# Patient Record
Sex: MALE | Race: WHITE | NOT HISPANIC OR LATINO | Employment: FULL TIME | URBAN - METROPOLITAN AREA
[De-identification: names, ages, dates, MRNs, and addresses within clinical notes are randomized per-mention and may not be internally consistent; named-entity substitution may affect disease eponyms.]

---

## 2018-02-15 ENCOUNTER — APPOINTMENT (OUTPATIENT)
Dept: RADIOLOGY | Facility: MEDICAL CENTER | Age: 48
End: 2018-02-15
Attending: EMERGENCY MEDICINE

## 2018-02-15 ENCOUNTER — HOSPITAL ENCOUNTER (EMERGENCY)
Facility: MEDICAL CENTER | Age: 48
End: 2018-02-15
Attending: EMERGENCY MEDICINE

## 2018-02-15 VITALS
HEIGHT: 70 IN | BODY MASS INDEX: 29.76 KG/M2 | SYSTOLIC BLOOD PRESSURE: 102 MMHG | WEIGHT: 207.89 LBS | DIASTOLIC BLOOD PRESSURE: 53 MMHG | RESPIRATION RATE: 21 BRPM | OXYGEN SATURATION: 95 % | HEART RATE: 79 BPM | TEMPERATURE: 99 F

## 2018-02-15 DIAGNOSIS — J10.1 INFLUENZA B: ICD-10-CM

## 2018-02-15 DIAGNOSIS — D69.6 THROMBOCYTOPENIA (HCC): ICD-10-CM

## 2018-02-15 DIAGNOSIS — E86.0 DEHYDRATION: ICD-10-CM

## 2018-02-15 LAB
ALBUMIN SERPL BCP-MCNC: 3.9 G/DL (ref 3.2–4.9)
ALBUMIN/GLOB SERPL: 1.1 G/DL
ALP SERPL-CCNC: 73 U/L (ref 30–99)
ALT SERPL-CCNC: 25 U/L (ref 2–50)
ANION GAP SERPL CALC-SCNC: 9 MMOL/L (ref 0–11.9)
APPEARANCE UR: CLEAR
AST SERPL-CCNC: 18 U/L (ref 12–45)
BASOPHILS # BLD AUTO: 0.4 % (ref 0–1.8)
BASOPHILS # BLD: 0.02 K/UL (ref 0–0.12)
BILIRUB SERPL-MCNC: 0.6 MG/DL (ref 0.1–1.5)
BUN SERPL-MCNC: 13 MG/DL (ref 8–22)
CALCIUM SERPL-MCNC: 9.2 MG/DL (ref 8.5–10.5)
CHLORIDE SERPL-SCNC: 105 MMOL/L (ref 96–112)
CO2 SERPL-SCNC: 23 MMOL/L (ref 20–33)
COLOR UR AUTO: YELLOW
CREAT SERPL-MCNC: 0.85 MG/DL (ref 0.5–1.4)
EOSINOPHIL # BLD AUTO: 0.16 K/UL (ref 0–0.51)
EOSINOPHIL NFR BLD: 3.5 % (ref 0–6.9)
ERYTHROCYTE [DISTWIDTH] IN BLOOD BY AUTOMATED COUNT: 42.7 FL (ref 35.9–50)
FLUAV RNA SPEC QL NAA+PROBE: NEGATIVE
FLUBV RNA SPEC QL NAA+PROBE: POSITIVE
GLOBULIN SER CALC-MCNC: 3.5 G/DL (ref 1.9–3.5)
GLUCOSE SERPL-MCNC: 88 MG/DL (ref 65–99)
GLUCOSE UR QL STRIP.AUTO: NEGATIVE MG/DL
HCT VFR BLD AUTO: 44 % (ref 42–52)
HGB BLD-MCNC: 14.5 G/DL (ref 14–18)
KETONES UR QL STRIP.AUTO: NEGATIVE MG/DL
LACTATE BLD-SCNC: 1.2 MMOL/L (ref 0.5–2)
LEUKOCYTE ESTERASE UR QL STRIP.AUTO: NEGATIVE
LYMPHOCYTES # BLD AUTO: 1.24 K/UL (ref 1–4.8)
LYMPHOCYTES NFR BLD: 27 % (ref 22–41)
MCH RBC QN AUTO: 26.3 PG (ref 27–33)
MCHC RBC AUTO-ENTMCNC: 33 G/DL (ref 33.7–35.3)
MCV RBC AUTO: 79.9 FL (ref 81.4–97.8)
MONOCYTES # BLD AUTO: 0.43 K/UL (ref 0–0.85)
MONOCYTES NFR BLD AUTO: 9.4 % (ref 0–13.4)
NEUTROPHILS # BLD AUTO: 2.74 K/UL (ref 1.82–7.42)
NEUTROPHILS NFR BLD: 59.7 % (ref 44–72)
NITRITE UR QL STRIP.AUTO: NEGATIVE
PH UR STRIP.AUTO: 5.5 [PH]
PLATELET # BLD AUTO: 98 K/UL (ref 164–446)
PMV BLD AUTO: 11.9 FL (ref 9–12.9)
POTASSIUM SERPL-SCNC: 3.7 MMOL/L (ref 3.6–5.5)
PROT SERPL-MCNC: 7.4 G/DL (ref 6–8.2)
PROT UR QL STRIP: NEGATIVE MG/DL
RBC # BLD AUTO: 5.51 M/UL (ref 4.7–6.1)
RBC UR QL AUTO: ABNORMAL
SODIUM SERPL-SCNC: 137 MMOL/L (ref 135–145)
SP GR UR: 1.01
WBC # BLD AUTO: 4.6 K/UL (ref 4.8–10.8)

## 2018-02-15 PROCEDURE — 99284 EMERGENCY DEPT VISIT MOD MDM: CPT

## 2018-02-15 PROCEDURE — 96360 HYDRATION IV INFUSION INIT: CPT

## 2018-02-15 PROCEDURE — 81002 URINALYSIS NONAUTO W/O SCOPE: CPT

## 2018-02-15 PROCEDURE — 87502 INFLUENZA DNA AMP PROBE: CPT

## 2018-02-15 PROCEDURE — 700105 HCHG RX REV CODE 258: Performed by: EMERGENCY MEDICINE

## 2018-02-15 PROCEDURE — 71046 X-RAY EXAM CHEST 2 VIEWS: CPT

## 2018-02-15 PROCEDURE — 85025 COMPLETE CBC W/AUTO DIFF WBC: CPT

## 2018-02-15 PROCEDURE — 80053 COMPREHEN METABOLIC PANEL: CPT

## 2018-02-15 PROCEDURE — 83605 ASSAY OF LACTIC ACID: CPT

## 2018-02-15 PROCEDURE — 700102 HCHG RX REV CODE 250 W/ 637 OVERRIDE(OP): Performed by: EMERGENCY MEDICINE

## 2018-02-15 PROCEDURE — 36415 COLL VENOUS BLD VENIPUNCTURE: CPT

## 2018-02-15 PROCEDURE — A9270 NON-COVERED ITEM OR SERVICE: HCPCS | Performed by: EMERGENCY MEDICINE

## 2018-02-15 RX ORDER — SODIUM CHLORIDE 9 MG/ML
1000 INJECTION, SOLUTION INTRAVENOUS ONCE
Status: COMPLETED | OUTPATIENT
Start: 2018-02-15 | End: 2018-02-15

## 2018-02-15 RX ORDER — ACETAMINOPHEN 325 MG/1
650 TABLET ORAL ONCE
Status: COMPLETED | OUTPATIENT
Start: 2018-02-15 | End: 2018-02-15

## 2018-02-15 RX ADMIN — ACETAMINOPHEN 650 MG: 325 TABLET, FILM COATED ORAL at 14:18

## 2018-02-15 RX ADMIN — SODIUM CHLORIDE 1000 ML: 9 INJECTION, SOLUTION INTRAVENOUS at 12:15

## 2018-02-15 ASSESSMENT — ENCOUNTER SYMPTOMS
FLANK PAIN: 1
SPUTUM PRODUCTION: 1
NAUSEA: 1
FEVER: 1
VOMITING: 0
COUGH: 1
SORE THROAT: 1
DIARRHEA: 1

## 2018-02-15 ASSESSMENT — PAIN SCALES - GENERAL: PAINLEVEL_OUTOF10: 6

## 2018-02-15 NOTE — ED PROVIDER NOTES
ED Provider Note    Scribed for Osmany Morataya M.D. by Aleida Ryder. 2/15/2018, 10:12 AM.    Primary care provider: Pcp Pt States None  Means of arrival: walk in   History obtained from: patient   History limited by: none     CHIEF COMPLAINT  Chief Complaint   Patient presents with   • Body Aches     symtoms started sunday   • Cough   • N/V       HPI  Prashant Russo Jr. is a 47 y.o. male who presents to the Emergency Department for evaluation of a productive cough onset  4 days ago with associated generalized body aches and subjective fevers. He has been taking Delsym and Motrin with no relief. Patient also reports having a sore throat in the past day and intermittent nausea for the past 3 days and 1-2 episodes of diarrhea yesterday with associated mild flank pain. He denies vomiting in the past day. Patient reports recent visit to [place].  Denies any symptoms at the time of his visit.  Does have sick contacts with the flu.  Also complains of nasal congestion, a bit of a sore throat associated with cough.      REVIEW OF SYSTEMS  Review of Systems   Constitutional: Positive for fever (subjective ).        + generalized body aches.    HENT: Positive for sore throat.    Respiratory: Positive for cough and sputum production.    Gastrointestinal: Positive for diarrhea and nausea. Negative for vomiting.   Genitourinary: Positive for flank pain.   All other systems reviewed and are negative.  C.       PAST MEDICAL HISTORY   has a past medical history of Dental disorder and Nephritic syndrome.      SURGICAL HISTORY   has a past surgical history that includes hip arthroscopy (3/2/2015); femoral neck osteoplasty (3/2/2015); and acetabular osteoplasty (3/2/2015).      SOCIAL HISTORY  Social History   Substance Use Topics   • Smoking status: Never Smoker   • Smokeless tobacco: Current User     Types: Chew   • Alcohol use Yes      Comment: 2 per week      History   Drug Use     Comment: 2/18/2015 marijuana, none in past 3  "months       FAMILY HISTORY  Family History   Problem Relation Age of Onset   • Diabetes Father    • Hypertension Father    • Stroke Maternal Grandmother    • Cancer Paternal Grandmother    • Heart Disease Father        CURRENT MEDICATIONS  Home Medications     Reviewed by Yamileth Cardoza, Student (Nurse Apprentice) on 02/15/18 at 1011  Med List Status: Partial   Medication Last Dose Status   ibuprofen (MOTRIN) 200 MG TABS 2/15/2018 Active   Multiple Vitamins-Minerals (MULTIVITAMIN PO) 2/1/2018 Active                ALLERGIES  No Known Allergies      PHYSICAL EXAM  VITAL SIGNS: /53   Pulse 88   Temp 37.2 °C (99 °F) (Temporal)   Resp 18   Ht 1.778 m (5' 10\")   Wt 94.3 kg (207 lb 14.3 oz)   SpO2 97%   BMI 29.83 kg/m²   Vitals reviewed.  Constitutional: Well developed, Well nourished, No acute distress, Non-toxic appearance.   HENT: Normocephalic, Atraumatic, Bilateral external ears normal, Oropharynx dry, No oral exudates, Swollen and erythematous nasal mucosa.   Eyes: PERRL, EOMI, Conjunctiva normal, No discharge.   Neck: Normal range of motion, No tenderness, Supple, No stridor.   Cardiovascular: Normal heart rate, Normal rhythm, No murmurs, No rubs, No gallops.   Thorax & Lungs: Normal breath sounds, No respiratory distress, No wheezing, No chest tenderness.   Abdomen: Bowel sounds normal, Soft, No tenderness  Skin: Warm, Dry, No erythema, No rash.   Back: No tenderness, No CVA tenderness.   Musculoskeletal: Good range of motion in all major joints.   Neurologic: Alert, Normal motor function, Normal sensory function, No focal deficits noted.   Psychiatric: Affect normal        LABS  Results for orders placed or performed during the hospital encounter of 02/15/18   CBC WITH DIFFERENTIAL   Result Value Ref Range    WBC 4.6 (L) 4.8 - 10.8 K/uL    RBC 5.51 4.70 - 6.10 M/uL    Hemoglobin 14.5 14.0 - 18.0 g/dL    Hematocrit 44.0 42.0 - 52.0 %    MCV 79.9 (L) 81.4 - 97.8 fL    MCH 26.3 (L) 27.0 - 33.0 " pg    MCHC 33.0 (L) 33.7 - 35.3 g/dL    RDW 42.7 35.9 - 50.0 fL    Platelet Count 98 (L) 164 - 446 K/uL    MPV 11.9 9.0 - 12.9 fL    Neutrophils-Polys 59.70 44.00 - 72.00 %    Lymphocytes 27.00 22.00 - 41.00 %    Monocytes 9.40 0.00 - 13.40 %    Eosinophils 3.50 0.00 - 6.90 %    Basophils 0.40 0.00 - 1.80 %    Neutrophils (Absolute) 2.74 1.82 - 7.42 K/uL    Lymphs (Absolute) 1.24 1.00 - 4.80 K/uL    Monos (Absolute) 0.43 0.00 - 0.85 K/uL    Eos (Absolute) 0.16 0.00 - 0.51 K/uL    Baso (Absolute) 0.02 0.00 - 0.12 K/uL   COMP METABOLIC PANEL   Result Value Ref Range    Sodium 137 135 - 145 mmol/L    Potassium 3.7 3.6 - 5.5 mmol/L    Chloride 105 96 - 112 mmol/L    Co2 23 20 - 33 mmol/L    Anion Gap 9.0 0.0 - 11.9    Glucose 88 65 - 99 mg/dL    Bun 13 8 - 22 mg/dL    Creatinine 0.85 0.50 - 1.40 mg/dL    Calcium 9.2 8.5 - 10.5 mg/dL    AST(SGOT) 18 12 - 45 U/L    ALT(SGPT) 25 2 - 50 U/L    Alkaline Phosphatase 73 30 - 99 U/L    Total Bilirubin 0.6 0.1 - 1.5 mg/dL    Albumin 3.9 3.2 - 4.9 g/dL    Total Protein 7.4 6.0 - 8.2 g/dL    Globulin 3.5 1.9 - 3.5 g/dL    A-G Ratio 1.1 g/dL   LACTIC ACID   Result Value Ref Range    Lactic Acid 1.2 0.5 - 2.0 mmol/L   INFLUENZA A/B BY PCR   Result Value Ref Range    Influenza virus A RNA Negative Negative    Influenza virus B, PCR POSITIVE (A) Negative   ESTIMATED GFR   Result Value Ref Range    GFR If African American >60 >60 mL/min/1.73 m 2    GFR If Non African American >60 >60 mL/min/1.73 m 2   POC UA   Result Value Ref Range    POC Color Yellow     POC Appearance Clear     POC Glucose Negative Negative mg/dL    POC Ketones Negative Negative mg/dL    POC Specific Gravity 1.010 1.005 - 1.030    POC Blood Trace-intact (A) Negative    POC Urine PH 5.5 5.0 - 8.0    POC Protein Negative Negative mg/dL    POC Nitrites Negative Negative    POC Leukocyte Esterase Negative Negative   All labs reviewed by me.          RADIOLOGY  DX-CHEST-2 VIEWS   Final Result      No acute  cardiopulmonary process is identified.      The radiologist's interpretation of all radiological studies have been reviewed by me.        COURSE & MEDICAL DECISION MAKING  Pertinent Labs & Imaging studies reviewed. (See chart for details)    Obtained and reviewed past medical records.    10:12 AM Patient seen and examined at bedside. Ordered for DX chest, lactic acid, influenza, estimated GFR, CBC, CMP, POC UA to evaluate.     12:13 PM Patien\t is marginally low blood pressure for his age and size.  Ordered IV fluids. .  I have reviewed his chart.  Previous blood pressures in the 110s over 80s.  This is not far off his baseline.  He does have a history of volume loss secondary to vomiting and diarrhea and doesn't look dehydrated.  We'll continue to observe him and I have ordered 1 L of normal saline.  He received an CMP are reassuring, as is his lactic acid.  Chest x-ray does not show an infiltrate.  I doubt there is any bacterial superinfection at this time.  He is nontoxic, well-appearing.    1:35 PM Patient reevaluated at bedside. Discussed diagnostic results with the patient in addition to plan of care. .  She felt a little better after some fluids.  His blood pressure is improved.    .  He does have low platelets, he'll be asked of this rechecked by his doctor in a few days.  This might be from viral suppression.    Recess after IV fluids.  Clinically, he has improved.    1:36 PM Paged the hopitalist.       DISPOSITION:  Discharged home in fair condition.    \Your Physician  Varies    Schedule an appointment as soon as possible for a visit in 2 days              FINAL IMPRESSION  1. Influenza B    2. Dehydration    3.  Thrombocytopenia     Aleida WESTON (Armani), am scribing for, and in the presence of, Osmany Morataya M.D..  Electronically signed by: Aleida Ryder (Armani), 2/15/2018  Osmany WESTON M.D. personally performed the services described in this documentation, as scribed by Aleida HAILE  Aleksey in my presence, and it is both accurate and complete.    The note accurately reflects work and decisions made by me.  Osmany Morataya  2/15/2018  2:18 PM

## 2018-02-15 NOTE — ED NOTES
Patient dc'd to home w/ spouse. Patient alert and oriented x 4. Patient ambulatory w/ steady gait. Patient verbalizes understanding of discharge instructions, follow up care, and OTC mediations. ERP at bedside to discuss abnormal labs w/ patient prior to discharge.

## 2018-02-15 NOTE — ED NOTES
Pt called back to triage for protocols.  ER protocol process explained to pt, including that pt may need IV and further testing once in room and seen by ERP.  Pt agreeable to start protocols.  Labs drawn and sent.  Pt provided with instruction and supplies for clean catch urine specimen.

## 2018-02-15 NOTE — ED TRIAGE NOTES
Pt ambulates to triage, wearing mask  Chief Complaint   Patient presents with   • Body Aches     symtoms started sunday   • Cough   • N/V   Pt asked to wait in lobby, pt updated on triage process and pt asked to inform RN of any changes.

## 2018-02-15 NOTE — DISCHARGE INSTRUCTIONS
Rest, drink plenty of fluids.  Follow-up with her doctor.  Return to the ER for worsening cough, fever, or other concerns.    your platelets were low.  Have this rechecked in  2 days.  See your doctor    Influenza, Adult  Influenza (flu) is an infection in the mouth, nose, and throat (respiratory tract) caused by a virus. The flu can make you feel very ill. Influenza spreads easily from person to person (contagious).   HOME CARE   · Only take medicines as told by your doctor.  · Use a cool mist humidifier to make breathing easier.  · Get plenty of rest until your fever goes away. This usually takes 3 to 4 days.  · Drink enough fluids to keep your pee (urine) clear or pale yellow.  · Cover your mouth and nose when you cough or sneeze.  · Wash your hands well to avoid spreading the flu.  · Stay home from work or school until your fever has been gone for at least 1 full day.  · Get a flu shot every year.  GET HELP RIGHT AWAY IF:   · You have trouble breathing or feel short of breath.  · Your skin or nails turn blue.  · You have severe neck pain or stiffness.  · You have a severe headache, facial pain, or earache.  · Your fever gets worse or keeps coming back.  · You feel sick to your stomach (nauseous), throw up (vomit), or have watery poop (diarrhea).  · You have chest pain.  · You have a deep cough that gets worse, or you cough up more thick spit (mucus).  MAKE SURE YOU:   · Understand these instructions.  · Will watch your condition.  · Will get help right away if you are not doing well or get worse.     This information is not intended to replace advice given to you by your health care provider. Make sure you discuss any questions you have with your health care provider.     Document Released: 09/26/2009 Document Revised: 01/08/2016 Document Reviewed: 03/18/2013  [employer] Patient Education ©2016 [employer]

## 2021-06-11 ENCOUNTER — APPOINTMENT (OUTPATIENT)
Dept: URGENT CARE | Facility: CLINIC | Age: 51
End: 2021-06-11

## 2022-02-17 ENCOUNTER — HOSPITAL ENCOUNTER (OUTPATIENT)
Dept: LAB | Facility: MEDICAL CENTER | Age: 52
End: 2022-02-17
Attending: PHYSICIAN ASSISTANT
Payer: COMMERCIAL

## 2022-02-17 LAB
ALBUMIN SERPL BCP-MCNC: 4.6 G/DL (ref 3.2–4.9)
ALBUMIN/GLOB SERPL: 1.8 G/DL
ALP SERPL-CCNC: 83 U/L (ref 30–99)
ALT SERPL-CCNC: 33 U/L (ref 2–50)
ANION GAP SERPL CALC-SCNC: 13 MMOL/L (ref 7–16)
AST SERPL-CCNC: 25 U/L (ref 12–45)
BASOPHILS # BLD AUTO: 0.7 % (ref 0–1.8)
BASOPHILS # BLD: 0.05 K/UL (ref 0–0.12)
BILIRUB SERPL-MCNC: 0.4 MG/DL (ref 0.1–1.5)
BUN SERPL-MCNC: 19 MG/DL (ref 8–22)
CALCIUM SERPL-MCNC: 9.6 MG/DL (ref 8.5–10.5)
CHLORIDE SERPL-SCNC: 102 MMOL/L (ref 96–112)
CHOLEST SERPL-MCNC: 393 MG/DL (ref 100–199)
CO2 SERPL-SCNC: 21 MMOL/L (ref 20–33)
CREAT SERPL-MCNC: <0.17 MG/DL (ref 0.5–1.4)
EOSINOPHIL # BLD AUTO: 0.28 K/UL (ref 0–0.51)
EOSINOPHIL NFR BLD: 4.1 % (ref 0–6.9)
ERYTHROCYTE [DISTWIDTH] IN BLOOD BY AUTOMATED COUNT: 46.1 FL (ref 35.9–50)
FASTING STATUS PATIENT QL REPORTED: NORMAL
GLOBULIN SER CALC-MCNC: 2.5 G/DL (ref 1.9–3.5)
GLUCOSE SERPL-MCNC: 93 MG/DL (ref 65–99)
HCT VFR BLD AUTO: 44.3 % (ref 42–52)
HDLC SERPL-MCNC: 30 MG/DL
HGB BLD-MCNC: 14.2 G/DL (ref 14–18)
IMM GRANULOCYTES # BLD AUTO: 0.04 K/UL (ref 0–0.11)
IMM GRANULOCYTES NFR BLD AUTO: 0.6 % (ref 0–0.9)
LDLC SERPL CALC-MCNC: ABNORMAL MG/DL
LYMPHOCYTES # BLD AUTO: 2.19 K/UL (ref 1–4.8)
LYMPHOCYTES NFR BLD: 32.4 % (ref 22–41)
MCH RBC QN AUTO: 27.2 PG (ref 27–33)
MCHC RBC AUTO-ENTMCNC: 32.1 G/DL (ref 33.7–35.3)
MCV RBC AUTO: 84.7 FL (ref 81.4–97.8)
MONOCYTES # BLD AUTO: 0.39 K/UL (ref 0–0.85)
MONOCYTES NFR BLD AUTO: 5.8 % (ref 0–13.4)
NEUTROPHILS # BLD AUTO: 3.81 K/UL (ref 1.82–7.42)
NEUTROPHILS NFR BLD: 56.4 % (ref 44–72)
NRBC # BLD AUTO: 0 K/UL
NRBC BLD-RTO: 0 /100 WBC
PLATELET # BLD AUTO: 161 K/UL (ref 164–446)
PMV BLD AUTO: 11.6 FL (ref 9–12.9)
POTASSIUM SERPL-SCNC: 4.6 MMOL/L (ref 3.6–5.5)
PROT SERPL-MCNC: 7.1 G/DL (ref 6–8.2)
RBC # BLD AUTO: 5.23 M/UL (ref 4.7–6.1)
SODIUM SERPL-SCNC: 136 MMOL/L (ref 135–145)
TRIGL SERPL-MCNC: 773 MG/DL (ref 0–149)
TSH SERPL DL<=0.005 MIU/L-ACNC: 1.54 UIU/ML (ref 0.38–5.33)
URATE SERPL-MCNC: 6.1 MG/DL (ref 2.5–8.3)
WBC # BLD AUTO: 6.8 K/UL (ref 4.8–10.8)

## 2022-02-17 PROCEDURE — 85025 COMPLETE CBC W/AUTO DIFF WBC: CPT

## 2022-02-17 PROCEDURE — 36415 COLL VENOUS BLD VENIPUNCTURE: CPT

## 2022-02-17 PROCEDURE — 80053 COMPREHEN METABOLIC PANEL: CPT

## 2022-02-17 PROCEDURE — 80061 LIPID PANEL: CPT

## 2022-02-17 PROCEDURE — 84443 ASSAY THYROID STIM HORMONE: CPT

## 2022-02-17 PROCEDURE — 84550 ASSAY OF BLOOD/URIC ACID: CPT

## 2022-09-07 ENCOUNTER — HOSPITAL ENCOUNTER (OUTPATIENT)
Dept: LAB | Facility: MEDICAL CENTER | Age: 52
End: 2022-09-07
Attending: PHYSICIAN ASSISTANT
Payer: COMMERCIAL

## 2022-09-07 LAB
ALBUMIN SERPL BCP-MCNC: 4.2 G/DL (ref 3.2–4.9)
ALBUMIN/GLOB SERPL: 1.8 G/DL
ALP SERPL-CCNC: 77 U/L (ref 30–99)
ALT SERPL-CCNC: 46 U/L (ref 2–50)
ANION GAP SERPL CALC-SCNC: 9 MMOL/L (ref 7–16)
AST SERPL-CCNC: 36 U/L (ref 12–45)
BASOPHILS # BLD AUTO: 0.7 % (ref 0–1.8)
BASOPHILS # BLD: 0.04 K/UL (ref 0–0.12)
BILIRUB SERPL-MCNC: 0.4 MG/DL (ref 0.1–1.5)
BUN SERPL-MCNC: 19 MG/DL (ref 8–22)
CALCIUM SERPL-MCNC: 9 MG/DL (ref 8.5–10.5)
CHLORIDE SERPL-SCNC: 105 MMOL/L (ref 96–112)
CHOLEST SERPL-MCNC: 347 MG/DL (ref 100–199)
CO2 SERPL-SCNC: 24 MMOL/L (ref 20–33)
CREAT SERPL-MCNC: 0.31 MG/DL (ref 0.5–1.4)
EOSINOPHIL # BLD AUTO: 0.27 K/UL (ref 0–0.51)
EOSINOPHIL NFR BLD: 4.6 % (ref 0–6.9)
ERYTHROCYTE [DISTWIDTH] IN BLOOD BY AUTOMATED COUNT: 45.6 FL (ref 35.9–50)
FASTING STATUS PATIENT QL REPORTED: NORMAL
GFR SERPLBLD CREATININE-BSD FMLA CKD-EPI: 142 ML/MIN/1.73 M 2
GLOBULIN SER CALC-MCNC: 2.4 G/DL (ref 1.9–3.5)
GLUCOSE SERPL-MCNC: 102 MG/DL (ref 65–99)
HCT VFR BLD AUTO: 43.9 % (ref 42–52)
HDLC SERPL-MCNC: 28 MG/DL
HGB BLD-MCNC: 14 G/DL (ref 14–18)
IMM GRANULOCYTES # BLD AUTO: 0.03 K/UL (ref 0–0.11)
IMM GRANULOCYTES NFR BLD AUTO: 0.5 % (ref 0–0.9)
LDLC SERPL CALC-MCNC: ABNORMAL MG/DL
LYMPHOCYTES # BLD AUTO: 1.7 K/UL (ref 1–4.8)
LYMPHOCYTES NFR BLD: 28.7 % (ref 22–41)
MCH RBC QN AUTO: 27 PG (ref 27–33)
MCHC RBC AUTO-ENTMCNC: 31.9 G/DL (ref 33.7–35.3)
MCV RBC AUTO: 84.6 FL (ref 81.4–97.8)
MONOCYTES # BLD AUTO: 0.39 K/UL (ref 0–0.85)
MONOCYTES NFR BLD AUTO: 6.6 % (ref 0–13.4)
NEUTROPHILS # BLD AUTO: 3.49 K/UL (ref 1.82–7.42)
NEUTROPHILS NFR BLD: 58.9 % (ref 44–72)
NRBC # BLD AUTO: 0 K/UL
NRBC BLD-RTO: 0 /100 WBC
PLATELET # BLD AUTO: 156 K/UL (ref 164–446)
PMV BLD AUTO: 12.2 FL (ref 9–12.9)
POTASSIUM SERPL-SCNC: 4.5 MMOL/L (ref 3.6–5.5)
PROT SERPL-MCNC: 6.6 G/DL (ref 6–8.2)
RBC # BLD AUTO: 5.19 M/UL (ref 4.7–6.1)
SODIUM SERPL-SCNC: 138 MMOL/L (ref 135–145)
TRIGL SERPL-MCNC: 561 MG/DL (ref 0–149)
TSH SERPL DL<=0.005 MIU/L-ACNC: 1.7 UIU/ML (ref 0.38–5.33)
WBC # BLD AUTO: 5.9 K/UL (ref 4.8–10.8)

## 2022-09-07 PROCEDURE — 80053 COMPREHEN METABOLIC PANEL: CPT

## 2022-09-07 PROCEDURE — 85025 COMPLETE CBC W/AUTO DIFF WBC: CPT

## 2022-09-07 PROCEDURE — 80061 LIPID PANEL: CPT

## 2022-09-07 PROCEDURE — 84443 ASSAY THYROID STIM HORMONE: CPT

## 2022-09-07 PROCEDURE — 36415 COLL VENOUS BLD VENIPUNCTURE: CPT

## 2023-04-25 ENCOUNTER — APPOINTMENT (OUTPATIENT)
Dept: RADIOLOGY | Facility: MEDICAL CENTER | Age: 53
End: 2023-04-25
Attending: EMERGENCY MEDICINE
Payer: MEDICAID

## 2023-04-25 ENCOUNTER — HOSPITAL ENCOUNTER (OUTPATIENT)
Facility: MEDICAL CENTER | Age: 53
End: 2023-04-27
Attending: EMERGENCY MEDICINE | Admitting: HOSPITALIST
Payer: MEDICAID

## 2023-04-25 DIAGNOSIS — I63.9 ACUTE STROKE DUE TO ISCHEMIA (HCC): ICD-10-CM

## 2023-04-25 DIAGNOSIS — R41.3 AMNESIA: ICD-10-CM

## 2023-04-25 DIAGNOSIS — E78.5 DYSLIPIDEMIA: ICD-10-CM

## 2023-04-25 DIAGNOSIS — R41.82 ALTERED MENTAL STATUS, UNSPECIFIED ALTERED MENTAL STATUS TYPE: ICD-10-CM

## 2023-04-25 PROBLEM — I65.23 STENOSIS OF BOTH INTERNAL CAROTID ARTERIES: Status: ACTIVE | Noted: 2023-04-25

## 2023-04-25 PROBLEM — R74.8 ELEVATED LIVER ENZYMES: Status: ACTIVE | Noted: 2023-04-25

## 2023-04-25 LAB
ABO + RH BLD: NORMAL
ABO GROUP BLD: NORMAL
ALBUMIN SERPL BCP-MCNC: 4.5 G/DL (ref 3.2–4.9)
ALBUMIN/GLOB SERPL: 1.7 G/DL
ALP SERPL-CCNC: 89 U/L (ref 30–99)
ALT SERPL-CCNC: 61 U/L (ref 2–50)
AMMONIA PLAS-SCNC: 14 UMOL/L (ref 11–45)
AMPHET UR QL SCN: NEGATIVE
ANION GAP SERPL CALC-SCNC: 12 MMOL/L (ref 7–16)
APTT PPP: 30.3 SEC (ref 24.7–36)
AST SERPL-CCNC: 56 U/L (ref 12–45)
BARBITURATES UR QL SCN: NEGATIVE
BASOPHILS # BLD AUTO: 0.4 % (ref 0–1.8)
BASOPHILS # BLD: 0.03 K/UL (ref 0–0.12)
BENZODIAZ UR QL SCN: NEGATIVE
BILIRUB SERPL-MCNC: 0.5 MG/DL (ref 0.1–1.5)
BLD GP AB SCN SERPL QL: NORMAL
BUN SERPL-MCNC: 15 MG/DL (ref 8–22)
BZE UR QL SCN: NEGATIVE
CALCIUM ALBUM COR SERPL-MCNC: 9.2 MG/DL (ref 8.5–10.5)
CALCIUM SERPL-MCNC: 9.6 MG/DL (ref 8.4–10.2)
CANNABINOIDS UR QL SCN: NEGATIVE
CHLORIDE SERPL-SCNC: 102 MMOL/L (ref 96–112)
CO2 SERPL-SCNC: 26 MMOL/L (ref 20–33)
CREAT SERPL-MCNC: 0.7 MG/DL (ref 0.5–1.4)
EKG IMPRESSION: NORMAL
EOSINOPHIL # BLD AUTO: 0.18 K/UL (ref 0–0.51)
EOSINOPHIL NFR BLD: 2.5 % (ref 0–6.9)
ERYTHROCYTE [DISTWIDTH] IN BLOOD BY AUTOMATED COUNT: 41.9 FL (ref 35.9–50)
ETHANOL BLD-MCNC: <10.1 MG/DL
GFR SERPLBLD CREATININE-BSD FMLA CKD-EPI: 110 ML/MIN/1.73 M 2
GLOBULIN SER CALC-MCNC: 2.6 G/DL (ref 1.9–3.5)
GLUCOSE BLD STRIP.AUTO-MCNC: 108 MG/DL (ref 65–99)
GLUCOSE SERPL-MCNC: 86 MG/DL (ref 65–99)
HCT VFR BLD AUTO: 43.8 % (ref 42–52)
HGB BLD-MCNC: 14.3 G/DL (ref 14–18)
IMM GRANULOCYTES # BLD AUTO: 0.03 K/UL (ref 0–0.11)
IMM GRANULOCYTES NFR BLD AUTO: 0.4 % (ref 0–0.9)
INR PPP: 1.05 (ref 0.87–1.13)
LYMPHOCYTES # BLD AUTO: 1.62 K/UL (ref 1–4.8)
LYMPHOCYTES NFR BLD: 22.8 % (ref 22–41)
MCH RBC QN AUTO: 26.9 PG (ref 27–33)
MCHC RBC AUTO-ENTMCNC: 32.6 G/DL (ref 33.7–35.3)
MCV RBC AUTO: 82.3 FL (ref 81.4–97.8)
METHADONE UR QL SCN: NEGATIVE
MONOCYTES # BLD AUTO: 0.34 K/UL (ref 0–0.85)
MONOCYTES NFR BLD AUTO: 4.8 % (ref 0–13.4)
NEUTROPHILS # BLD AUTO: 4.92 K/UL (ref 1.82–7.42)
NEUTROPHILS NFR BLD: 69.1 % (ref 44–72)
NRBC # BLD AUTO: 0 K/UL
NRBC BLD-RTO: 0 /100 WBC
OPIATES UR QL SCN: NEGATIVE
OXYCODONE UR QL SCN: NEGATIVE
PCP UR QL SCN: NEGATIVE
PLATELET # BLD AUTO: 162 K/UL (ref 164–446)
PMV BLD AUTO: 11 FL (ref 9–12.9)
POTASSIUM SERPL-SCNC: 4.1 MMOL/L (ref 3.6–5.5)
PROPOXYPH UR QL SCN: NEGATIVE
PROT SERPL-MCNC: 7.1 G/DL (ref 6–8.2)
PROTHROMBIN TIME: 13.6 SEC (ref 12–14.6)
RBC # BLD AUTO: 5.32 M/UL (ref 4.7–6.1)
RH BLD: NORMAL
SODIUM SERPL-SCNC: 140 MMOL/L (ref 135–145)
TROPONIN T SERPL-MCNC: 7 NG/L (ref 6–19)
WBC # BLD AUTO: 7.1 K/UL (ref 4.8–10.8)

## 2023-04-25 PROCEDURE — 85730 THROMBOPLASTIN TIME PARTIAL: CPT

## 2023-04-25 PROCEDURE — 71045 X-RAY EXAM CHEST 1 VIEW: CPT

## 2023-04-25 PROCEDURE — 86901 BLOOD TYPING SEROLOGIC RH(D): CPT

## 2023-04-25 PROCEDURE — 93005 ELECTROCARDIOGRAM TRACING: CPT | Performed by: EMERGENCY MEDICINE

## 2023-04-25 PROCEDURE — 82140 ASSAY OF AMMONIA: CPT

## 2023-04-25 PROCEDURE — 85025 COMPLETE CBC W/AUTO DIFF WBC: CPT

## 2023-04-25 PROCEDURE — 70498 CT ANGIOGRAPHY NECK: CPT

## 2023-04-25 PROCEDURE — 36415 COLL VENOUS BLD VENIPUNCTURE: CPT

## 2023-04-25 PROCEDURE — 700102 HCHG RX REV CODE 250 W/ 637 OVERRIDE(OP): Performed by: HOSPITALIST

## 2023-04-25 PROCEDURE — 82077 ASSAY SPEC XCP UR&BREATH IA: CPT

## 2023-04-25 PROCEDURE — 99285 EMERGENCY DEPT VISIT HI MDM: CPT

## 2023-04-25 PROCEDURE — 70450 CT HEAD/BRAIN W/O DYE: CPT

## 2023-04-25 PROCEDURE — G0378 HOSPITAL OBSERVATION PER HR: HCPCS

## 2023-04-25 PROCEDURE — 82962 GLUCOSE BLOOD TEST: CPT

## 2023-04-25 PROCEDURE — 80307 DRUG TEST PRSMV CHEM ANLYZR: CPT

## 2023-04-25 PROCEDURE — 0042T CT-CEREBRAL PERFUSION ANALYSIS: CPT

## 2023-04-25 PROCEDURE — 70496 CT ANGIOGRAPHY HEAD: CPT

## 2023-04-25 PROCEDURE — A9270 NON-COVERED ITEM OR SERVICE: HCPCS | Performed by: HOSPITALIST

## 2023-04-25 PROCEDURE — 86850 RBC ANTIBODY SCREEN: CPT

## 2023-04-25 PROCEDURE — 86900 BLOOD TYPING SEROLOGIC ABO: CPT

## 2023-04-25 PROCEDURE — 85610 PROTHROMBIN TIME: CPT

## 2023-04-25 PROCEDURE — 700117 HCHG RX CONTRAST REV CODE 255: Performed by: EMERGENCY MEDICINE

## 2023-04-25 PROCEDURE — 99223 1ST HOSP IP/OBS HIGH 75: CPT | Performed by: HOSPITALIST

## 2023-04-25 PROCEDURE — 80053 COMPREHEN METABOLIC PANEL: CPT

## 2023-04-25 PROCEDURE — 84484 ASSAY OF TROPONIN QUANT: CPT

## 2023-04-25 RX ORDER — AMOXICILLIN 250 MG
2 CAPSULE ORAL 2 TIMES DAILY
Status: DISCONTINUED | OUTPATIENT
Start: 2023-04-25 | End: 2023-04-27 | Stop reason: HOSPADM

## 2023-04-25 RX ORDER — ASPIRIN 300 MG/1
300 SUPPOSITORY RECTAL DAILY
Status: DISCONTINUED | OUTPATIENT
Start: 2023-04-26 | End: 2023-04-25

## 2023-04-25 RX ORDER — ACETAMINOPHEN 325 MG/1
650 TABLET ORAL EVERY 6 HOURS PRN
Status: DISCONTINUED | OUTPATIENT
Start: 2023-04-25 | End: 2023-04-27 | Stop reason: HOSPADM

## 2023-04-25 RX ORDER — ASPIRIN 81 MG/1
81 TABLET, CHEWABLE ORAL DAILY
Status: DISCONTINUED | OUTPATIENT
Start: 2023-04-26 | End: 2023-04-25

## 2023-04-25 RX ORDER — BISACODYL 10 MG
10 SUPPOSITORY, RECTAL RECTAL
Status: DISCONTINUED | OUTPATIENT
Start: 2023-04-25 | End: 2023-04-27 | Stop reason: HOSPADM

## 2023-04-25 RX ORDER — POLYETHYLENE GLYCOL 3350 17 G/17G
1 POWDER, FOR SOLUTION ORAL
Status: DISCONTINUED | OUTPATIENT
Start: 2023-04-25 | End: 2023-04-27 | Stop reason: HOSPADM

## 2023-04-25 RX ADMIN — IOHEXOL 40 ML: 350 INJECTION, SOLUTION INTRAVENOUS at 18:22

## 2023-04-25 RX ADMIN — RIVAROXABAN 10 MG: 10 TABLET, FILM COATED ORAL at 21:30

## 2023-04-25 RX ADMIN — IOHEXOL 100 ML: 350 INJECTION, SOLUTION INTRAVENOUS at 18:24

## 2023-04-25 ASSESSMENT — ENCOUNTER SYMPTOMS
VOMITING: 0
EYE DISCHARGE: 0
CHILLS: 0
FLANK PAIN: 0
EYE REDNESS: 0
NERVOUS/ANXIOUS: 0
COUGH: 0
FOCAL WEAKNESS: 0
ABDOMINAL PAIN: 0
MYALGIAS: 0
SHORTNESS OF BREATH: 0
STRIDOR: 0
BRUISES/BLEEDS EASILY: 0
FEVER: 0
DIZZINESS: 1

## 2023-04-25 ASSESSMENT — COGNITIVE AND FUNCTIONAL STATUS - GENERAL
MOBILITY SCORE: 24
DAILY ACTIVITIY SCORE: 24
SUGGESTED CMS G CODE MODIFIER MOBILITY: CH
SUGGESTED CMS G CODE MODIFIER DAILY ACTIVITY: CH

## 2023-04-25 ASSESSMENT — LIFESTYLE VARIABLES
ON A TYPICAL DAY WHEN YOU DRINK ALCOHOL HOW MANY DRINKS DO YOU HAVE: 3
TOTAL SCORE: 0
CONSUMPTION TOTAL: NEGATIVE
ALCOHOL_USE: YES
EVER FELT BAD OR GUILTY ABOUT YOUR DRINKING: NO
HAVE PEOPLE ANNOYED YOU BY CRITICIZING YOUR DRINKING: NO
TOTAL SCORE: 0
EVER HAD A DRINK FIRST THING IN THE MORNING TO STEADY YOUR NERVES TO GET RID OF A HANGOVER: NO
HAVE YOU EVER FELT YOU SHOULD CUT DOWN ON YOUR DRINKING: NO
HOW MANY TIMES IN THE PAST YEAR HAVE YOU HAD 5 OR MORE DRINKS IN A DAY: 0
TOTAL SCORE: 0
AVERAGE NUMBER OF DAYS PER WEEK YOU HAVE A DRINK CONTAINING ALCOHOL: 2

## 2023-04-25 ASSESSMENT — PAIN DESCRIPTION - PAIN TYPE: TYPE: ACUTE PAIN

## 2023-04-25 ASSESSMENT — PATIENT HEALTH QUESTIONNAIRE - PHQ9
1. LITTLE INTEREST OR PLEASURE IN DOING THINGS: NOT AT ALL
SUM OF ALL RESPONSES TO PHQ9 QUESTIONS 1 AND 2: 0
2. FEELING DOWN, DEPRESSED, IRRITABLE, OR HOPELESS: NOT AT ALL

## 2023-04-25 ASSESSMENT — FIBROSIS 4 INDEX: FIB4 SCORE: 1.77

## 2023-04-26 ENCOUNTER — APPOINTMENT (OUTPATIENT)
Dept: RADIOLOGY | Facility: MEDICAL CENTER | Age: 53
End: 2023-04-26
Attending: HOSPITALIST
Payer: MEDICAID

## 2023-04-26 ENCOUNTER — APPOINTMENT (OUTPATIENT)
Dept: CARDIOLOGY | Facility: MEDICAL CENTER | Age: 53
End: 2023-04-26
Attending: HOSPITALIST
Payer: MEDICAID

## 2023-04-26 ENCOUNTER — PATIENT OUTREACH (OUTPATIENT)
Dept: SCHEDULING | Facility: IMAGING CENTER | Age: 53
End: 2023-04-26

## 2023-04-26 LAB
ALBUMIN SERPL BCP-MCNC: 4 G/DL (ref 3.2–4.9)
ALBUMIN/GLOB SERPL: 1.4 G/DL
ALP SERPL-CCNC: 75 U/L (ref 30–99)
ALT SERPL-CCNC: 46 U/L (ref 2–50)
ANION GAP SERPL CALC-SCNC: 13 MMOL/L (ref 7–16)
AST SERPL-CCNC: 27 U/L (ref 12–45)
BILIRUB SERPL-MCNC: 0.4 MG/DL (ref 0.1–1.5)
BUN SERPL-MCNC: 13 MG/DL (ref 8–22)
CALCIUM ALBUM COR SERPL-MCNC: 9.1 MG/DL (ref 8.5–10.5)
CALCIUM SERPL-MCNC: 9.1 MG/DL (ref 8.4–10.2)
CHLORIDE SERPL-SCNC: 102 MMOL/L (ref 96–112)
CHOLEST SERPL-MCNC: 280 MG/DL (ref 100–199)
CO2 SERPL-SCNC: 23 MMOL/L (ref 20–33)
CREAT SERPL-MCNC: 0.75 MG/DL (ref 0.5–1.4)
ERYTHROCYTE [DISTWIDTH] IN BLOOD BY AUTOMATED COUNT: 42.1 FL (ref 35.9–50)
EST. AVERAGE GLUCOSE BLD GHB EST-MCNC: 126 MG/DL
GFR SERPLBLD CREATININE-BSD FMLA CKD-EPI: 108 ML/MIN/1.73 M 2
GLOBULIN SER CALC-MCNC: 2.9 G/DL (ref 1.9–3.5)
GLUCOSE SERPL-MCNC: 149 MG/DL (ref 65–99)
HAV IGM SERPL QL IA: NORMAL
HBA1C MFR BLD: 6 % (ref 4–5.6)
HBV CORE IGM SER QL: NORMAL
HBV SURFACE AG SER QL: NORMAL
HCT VFR BLD AUTO: 41.5 % (ref 42–52)
HCV AB SER QL: NORMAL
HDLC SERPL-MCNC: 32 MG/DL
HGB BLD-MCNC: 13.7 G/DL (ref 14–18)
LDLC SERPL CALC-MCNC: ABNORMAL MG/DL
LV EJECT FRACT  99904: 60
LV EJECT FRACT MOD 2C 99903: 60.04
LV EJECT FRACT MOD 4C 99902: 66.95
LV EJECT FRACT MOD BP 99901: 61.91
MAGNESIUM SERPL-MCNC: 2.1 MG/DL (ref 1.5–2.5)
MCH RBC QN AUTO: 27 PG (ref 27–33)
MCHC RBC AUTO-ENTMCNC: 33 G/DL (ref 33.7–35.3)
MCV RBC AUTO: 81.7 FL (ref 81.4–97.8)
PLATELET # BLD AUTO: 151 K/UL (ref 164–446)
PMV BLD AUTO: 11 FL (ref 9–12.9)
POTASSIUM SERPL-SCNC: 3.5 MMOL/L (ref 3.6–5.5)
PROT SERPL-MCNC: 6.9 G/DL (ref 6–8.2)
RBC # BLD AUTO: 5.08 M/UL (ref 4.7–6.1)
SODIUM SERPL-SCNC: 138 MMOL/L (ref 135–145)
TRIGL SERPL-MCNC: 557 MG/DL (ref 0–149)
WBC # BLD AUTO: 6.3 K/UL (ref 4.8–10.8)

## 2023-04-26 PROCEDURE — 95819 EEG AWAKE AND ASLEEP: CPT | Mod: 26 | Performed by: PSYCHIATRY & NEUROLOGY

## 2023-04-26 PROCEDURE — 97165 OT EVAL LOW COMPLEX 30 MIN: CPT

## 2023-04-26 PROCEDURE — 76705 ECHO EXAM OF ABDOMEN: CPT

## 2023-04-26 PROCEDURE — 700102 HCHG RX REV CODE 250 W/ 637 OVERRIDE(OP): Performed by: HOSPITALIST

## 2023-04-26 PROCEDURE — 83036 HEMOGLOBIN GLYCOSYLATED A1C: CPT

## 2023-04-26 PROCEDURE — 93306 TTE W/DOPPLER COMPLETE: CPT

## 2023-04-26 PROCEDURE — 95816 EEG AWAKE AND DROWSY: CPT | Performed by: PSYCHIATRY & NEUROLOGY

## 2023-04-26 PROCEDURE — 80074 ACUTE HEPATITIS PANEL: CPT

## 2023-04-26 PROCEDURE — 36415 COLL VENOUS BLD VENIPUNCTURE: CPT

## 2023-04-26 PROCEDURE — 83735 ASSAY OF MAGNESIUM: CPT

## 2023-04-26 PROCEDURE — 93306 TTE W/DOPPLER COMPLETE: CPT | Mod: 26 | Performed by: INTERNAL MEDICINE

## 2023-04-26 PROCEDURE — G0378 HOSPITAL OBSERVATION PER HR: HCPCS

## 2023-04-26 PROCEDURE — 85027 COMPLETE CBC AUTOMATED: CPT

## 2023-04-26 PROCEDURE — 80061 LIPID PANEL: CPT

## 2023-04-26 PROCEDURE — 80053 COMPREHEN METABOLIC PANEL: CPT

## 2023-04-26 PROCEDURE — 99231 SBSQ HOSP IP/OBS SF/LOW 25: CPT | Performed by: INTERNAL MEDICINE

## 2023-04-26 PROCEDURE — 70551 MRI BRAIN STEM W/O DYE: CPT

## 2023-04-26 PROCEDURE — 92523 SPEECH SOUND LANG COMPREHEN: CPT

## 2023-04-26 PROCEDURE — 95819 EEG AWAKE AND ASLEEP: CPT | Performed by: PSYCHIATRY & NEUROLOGY

## 2023-04-26 PROCEDURE — A9270 NON-COVERED ITEM OR SERVICE: HCPCS | Performed by: HOSPITALIST

## 2023-04-26 PROCEDURE — 97535 SELF CARE MNGMENT TRAINING: CPT

## 2023-04-26 PROCEDURE — 97161 PT EVAL LOW COMPLEX 20 MIN: CPT

## 2023-04-26 PROCEDURE — 94760 N-INVAS EAR/PLS OXIMETRY 1: CPT

## 2023-04-26 RX ORDER — ATORVASTATIN CALCIUM 40 MG/1
40 TABLET, FILM COATED ORAL NIGHTLY
Qty: 30 TABLET | Refills: 0 | Status: SHIPPED | OUTPATIENT
Start: 2023-04-26 | End: 2023-04-27 | Stop reason: SDUPTHER

## 2023-04-26 RX ORDER — ATORVASTATIN CALCIUM 20 MG/1
20 TABLET, FILM COATED ORAL NIGHTLY
Status: ON HOLD | COMMUNITY
End: 2023-04-26

## 2023-04-26 RX ORDER — FENOFIBRATE 145 MG/1
145 TABLET, COATED ORAL DAILY
Qty: 30 TABLET | Refills: 0 | Status: SHIPPED | OUTPATIENT
Start: 2023-04-26

## 2023-04-26 RX ADMIN — RIVAROXABAN 10 MG: 10 TABLET, FILM COATED ORAL at 17:38

## 2023-04-26 RX ADMIN — ACETAMINOPHEN 650 MG: 325 TABLET, FILM COATED ORAL at 21:24

## 2023-04-26 RX ADMIN — ACETAMINOPHEN 650 MG: 325 TABLET, FILM COATED ORAL at 14:31

## 2023-04-26 ASSESSMENT — PAIN DESCRIPTION - PAIN TYPE
TYPE: ACUTE PAIN;CHRONIC PAIN
TYPE: ACUTE PAIN
TYPE: ACUTE PAIN;CHRONIC PAIN

## 2023-04-26 ASSESSMENT — COGNITIVE AND FUNCTIONAL STATUS - GENERAL
DAILY ACTIVITIY SCORE: 24
SUGGESTED CMS G CODE MODIFIER DAILY ACTIVITY: CH

## 2023-04-26 ASSESSMENT — GAIT ASSESSMENTS
GAIT LEVEL OF ASSIST: INDEPENDENT
DEVIATION: STEP TO
DISTANCE (FEET): 100
DISTANCE (FEET): 200

## 2023-04-26 ASSESSMENT — ACTIVITIES OF DAILY LIVING (ADL): TOILETING: INDEPENDENT

## 2023-04-26 NOTE — THERAPY
"Occupational Therapy   Initial Evaluation     Patient Name: Prashant Russo Jr.  Age:  52 y.o., Sex:  male  Medical Record #: [id number]  Today's Date: 4/26/2023          Assessment  Patient is 52 y.o. male with a diagnosis of Altered Mental Status.  Pt and wife reside in a [facility] in [place], [place].  Wife and family available to assist as needed.  PLOF Indep for ADL's, transfers and functional mobility w/out a device.  Pt demonstrated Indep for bed mobility, Indep sit/stand, Mod I ambulation w/out a device, Indep transfers Indep toileting, Indep U/LB clothing management, Indep standing G&H.  Therapist reviewed/educated pt on environmental/home safety, fall precautions, ADL's and transfers.  No further skilled occupational therapy recommended at this time.    Plan    DC Equipment Recommendations: (P) None  Discharge Recommendations: (P) Anticipate that the patient will have no further occupational therapy needs after discharge from the hospital     Subjective    Pt was alert and cooperative w/ tx.  Pt reported feeling \"Foggy\" in thought     Objective       04/26/23 1024    Services   Is patient using  services for this encounter? No   Initial Contact Note    Initial Contact Note Order Received and Verified, Evaluation Only - Patient Does Not Require Further Acute Occupational Therapy at this Time.  However, May Benefit from Post Acute Therapy for Higher Level Functional Deficits.   Prior Living Situation   Prior Services None;Home-Independent   Housing / Facility 1 Story House   Steps Into Home 1   Steps In Home 0   Rail None   Bathroom Set up Walk In Shower;Shower Glass Doors   Equipment Owned None   Lives with - Patient's Self Care Capacity Spouse   Comments Pt and wife reside in a [facility] in [place], [place].  Wife and family available to assist as needed.  PLOF Indep for ADL's, transfers and functional mobility w/out a device.   Prior Level of ADL Function   Self Feeding Independent   Grooming " "/ Hygiene Independent   Bathing Independent   Dressing Independent   Toileting Independent   Prior Level of IADL Function   Medication Management Independent   Laundry Independent   Kitchen Mobility Independent   Finances Independent   Home Management Independent   Shopping Independent   Prior Level Of Mobility Independent Without Device in Community;Independent With Steps in Community;Independent Without Device in Home;Independent With Steps in Home   Driving / Transportation Driving Independent   History of Falls   History of Falls No   Vitals   Pulse Oximetry 95 %   O2 Delivery Device Room air w/o2 available   Pain   Intervention Declines   Cognition    Cognition / Consciousness WDL   Level of Consciousness Alert   Comments Pt reported feeling \"Foggy\"   Passive ROM Upper Body   Passive ROM Upper Body WDL   Active ROM Upper Body   Active ROM Upper Body  WDL   Dominant Hand Right   Strength Upper Body   Upper Body Strength  WDL   Sensation Upper Body   Upper Extremity Sensation  WDL   Upper Body Muscle Tone   Upper Body Muscle Tone  WDL   Coordination Upper Body   Coordination WDL   Balance Assessment   Sitting Balance (Static) Good   Sitting Balance (Dynamic) Good   Standing Balance (Static) Good   Standing Balance (Dynamic) Good   Weight Shift Sitting Good   Weight Shift Standing Good   Bed Mobility    Supine to Sit Independent   Sit to Supine Independent   Scooting Independent   ADL Assessment   Eating Independent   Grooming Independent;Standing   Upper Body Dressing Independent   Lower Body Dressing Independent   Toileting Independent   How much help from another person does the patient currently need...   Putting on and taking off regular lower body clothing? 4   Bathing (including washing, rinsing, and drying)? 4   Toileting, which includes using a toilet, bedpan, or urinal? 4   Putting on and taking off regular upper body clothing? 4   Taking care of personal grooming such as brushing teeth? 4   Eating " meals? 4   6 Clicks Daily Activity Score 24   Functional Mobility   Sit to Stand Independent   Bed, Chair, Wheelchair Transfer Independent   Toilet Transfers Independent   Transfer Method Stand Step   Mobility ambulation w/out device, EOB>bathroom>sink>hallway>bed   Distance (Feet) 100   # of Times Distance was Traveled 1   Edema / Skin Assessment   Edema / Skin  Not Assessed   Activity Tolerance   Sitting in Chair 5   Sitting Edge of Bed 10   Standing 5x2   Comments sitting/commode 5   Education Group   Education Provided Role of Occupational Therapist;Activities of Daily Living;Transfers   Role of Occupational Therapist Patient Response Patient;Significant Other;Acceptance;Explanation;Verbal Demonstration   Transfers Patient Response Patient;Significant Other;Acceptance;Explanation;Demonstration;Verbal Demonstration;Action Demonstration   ADL Patient Response Patient;Significant Other;Acceptance;Demonstration;Explanation;Verbal Demonstration;Action Demonstration   Anticipated Discharge Equipment and Recommendations   DC Equipment Recommendations None   Discharge Recommendations Anticipate that the patient will have no further occupational therapy needs after discharge from the hospital

## 2023-04-26 NOTE — CARE PLAN
The patient is Stable - Low risk of patient condition declining or worsening    Shift Goals  Clinical Goals: MRI brain,EEG,Echo, US RUQ  Patient Goals: rest,sleep    Progress made toward(s) clinical / shift goals:  Pt alert and oriented to self,place and situation but not to time. Pt also unable to recall what happen yesterday. Pt lightheaded slightly when up . Gait steady. Willcontinue to monitor neuro status q4hrs. Pending MRI brain, US RUQ and EEG/ECHO.    Problem: Neuro Status  Goal: Neuro status will remain stable or improve  Outcome: Progressing     Problem: Hemodynamic Monitoring  Goal: Patient's hemodynamics, fluid balance and neurologic status will be stable or improve  Outcome: Progressing     Problem: Self Care  Goal: Patient will have the ability to perform ADLs independently or with assistance (bathe, groom, dress, toilet and feed)  Outcome: Progressing     Problem: Knowledge Deficit - Standard  Goal: Patient and family/care givers will demonstrate understanding of plan of care, disease process/condition, diagnostic tests and medications  Outcome: Progressing       Patient is not progressing towards the following goals:

## 2023-04-26 NOTE — CARE PLAN
The patient is Stable - Low risk of patient condition declining or worsening    Shift Goals  Clinical Goals: MRI brain,EEG,Echo, US RUQ  Patient Goals: rest,sleep    Progress made toward(s) clinical / shift goals:  yes    Patient is not progressing towards the following goals:

## 2023-04-26 NOTE — ASSESSMENT & PLAN NOTE
Admit to Neuro, with continuous cardiac monitoring  CT, CT-angiography, head, neck showed no acute infarction, or hemorrhage    Echo ordered  NPO, till screened by speech  Aspiration, Fall, and seizure precautions    Neuro checks   Physical, occupational therapy evaluated pt- no needs  Physiatry consult placed   MRI brain pending

## 2023-04-26 NOTE — ASSESSMENT & PLAN NOTE
Mild bilateral atherosclerosis with less than 50% ICA stenosis  I will start aspirin  Consider starting high intensity statin based on hepatitis panel and RUQ US

## 2023-04-26 NOTE — PROGRESS NOTES
Charge Nurse Rounding Note    Bedside rounding completed to address quality of care and overall patient experience.    Patient Satisfaction addressed including staff responsiveness. Patient/family are aware of the POC and any questions answered. Thorough safety education completed including use of call light prior to all mobility throughout the entirety of the hospital stay.     Patient/family aware of time of next Hourly Round.    No further questions/concerns currently.     Additional Notes: Pt happy with care provided, EEG tech at bedside.

## 2023-04-26 NOTE — PROGRESS NOTES
4 Eyes Skin Assessment Completed by MYRNA Downs and MYRNA De Los Santos.    Head WDL  Ears WDL  Nose WDL  Mouth WDL  Neck WDL  Breast/Chest WDL/tattoo  Shoulder Blades WDL  Spine WDL  (R) Arm/Elbow/Hand WDL  (L) Arm/Elbow/Hand WDL  Abdomen WDL  Groin WDL  Scrotum/Coccyx/Buttocks WDL  (R) Leg WDL  (L) Leg WDL  (R) Heel/Foot/Toe WDL  (L) Heel/Foot/Toe WDL          Devices In Places Tele Box      Interventions In Place Pillows    Possible Skin Injury No    Pictures Uploaded Into Epic N/A  Wound Consult Placed N/A  RN Wound Prevention Protocol Ordered No

## 2023-04-26 NOTE — THERAPY
Physical Therapy   Initial Evaluation     Patient Name: Prashant Russo Jr.  Age:  52 y.o., Sex:  male  Medical Record #: [id number]  Today's Date: 4/26/2023          Assessment  Patient is 52 y.o. male with a diagnosis of Altered mental status.   04/26/23 1431   Charge Group   PT Evaluation PT Evaluation Low   Total Time Spent   PT Evaluation Time Spent (Mins) 25   Initial Contact Note    Initial Contact Note Order Received and Verified, Physical Therapy Evaluation in Progress with Full Report to Follow.   Pain   Intervention Medication (see MAR)   Pain 0 - 10 Group   Location Head   Location Orientation Lower   Pain Rating Scale (NPRS) 3   Description Aching   Comfort Goal Comfort with Movement;Sleep Comfortably   Prior Living Situation   Prior Services Home-Independent   Steps Into Home 1   Steps In Home 0   Equipment Owned None   Lives with - Patient's Self Care Capacity Spouse   Prior Level of Functional Mobility   Bed Mobility Independent   Transfer Status Independent   Ambulation Independent   Ambulation Distance community [facility]   Assistive Devices Used None   History of Falls   History of Falls No   Cognition    Cognition / Consciousness WDL   Passive ROM Lower Body   Passive ROM Lower Body WDL   Active ROM Lower Body    Active ROM Lower Body  WDL   Strength Lower Body   Lower Body Strength  WDL   Coordination Upper Body   Coordination WDL   Coordination Lower Body    Coordination Lower Body  WDL   Balance Assessment   Sitting Balance (Static) Good   Sitting Balance (Dynamic) Good   Standing Balance (Static) Good   Standing Balance (Dynamic) Good   Weight Shift Sitting Good   Weight Shift Standing Good   Bed Mobility    Supine to Sit Independent   Sit to Supine Independent   Scooting Independent   Gait Analysis   Gait Level Of Assist Independent   Assistive Device None   Distance (Feet) 200   # of Times Distance was Traveled 1   Deviation Step To   Weight Bearing Status full   Functional Mobility   Sit to Stand  Independent   Bed, Chair, Wheelchair Transfer Independent   Toilet Transfers Independent   Activity Tolerance   Sitting Edge of Bed 10   Standing 10   Patient / Family Goals    Patient / Family Goal #1 Home   Anticipated Discharge Equipment and Recommendations   DC Equipment Recommendations None   Discharge Recommendations Anticipate that the patient will have no further physical therapy needs after discharge from the hospital   Interdisciplinary Plan of Care Collaboration   IDT Collaboration with  Nursing   Session Information   Date / Session Number  4/26   Priority 0      Pt lives at home with wife and is active.Pt is safe with bed mob,transfers and ambulation.Pt is safe for home    Plan  DC Equipment Recommendations: (P) None  Discharge Recommendations: (P) Anticipate that the patient will have no further physical therapy needs after discharge from the hospital   Objective

## 2023-04-26 NOTE — ASSESSMENT & PLAN NOTE
Likely related to alcohol use   No abd pain. Has minimal RUQ tenderness  Continue to montior   I will check acute hepatitis panel to rule out other potential causes.  I will check RUQ US   Continue to monitor, avoid/minimize hepatotoxins as much as possible.

## 2023-04-26 NOTE — ED NOTES
Pt spouse stated that the Pt forgot that he played with his kids today and other random things;    Pt son stated that his father (Pt) sounded normal on the phone around 1100 today, however he noticed the confusion around 1330 today while speaking over the phone - stated that the Pt was calling asking how to do something that he has been doing for a long time;    Pt appears to have no facial droop, no slurred speech, no drift while holding up arms and legs, only confusion remember things - thought the year was 2024 and the president was Samina;    ERP notified and aware of these things;

## 2023-04-26 NOTE — ASSESSMENT & PLAN NOTE
Will check MRI brain-pending  Will check EEG. ? Post ictal amnesia   EEG not suggestive of seizures

## 2023-04-26 NOTE — ED TRIAGE NOTES
"Patient presents to the ER with the following complaints:  .  Chief Complaint   Patient presents with    ALOC     Patient AxO3 unable to tell time. Patient has had severe episode of amnesia like for this morning.        /88   Pulse 83   Temp 36.2 °C (97.1 °F) (Temporal)   Resp 16   Ht 1.803 m (5' 11\")   Wt 99.8 kg (220 lb 0.3 oz)   SpO2 97%   BMI 30.69 kg/m²       "

## 2023-04-26 NOTE — PROGRESS NOTES
Telemetry Shift Summary     Rhythm SR  HR Range 63-77  Ectopy none  Measurements .20/.08/.34           Normal Values  Rhythm SR  HR Range    Measurements 0.12-0.20 / 0.06-0.10  / 0.30-0.52

## 2023-04-26 NOTE — PROCEDURES
VIDEO ELECTROENCEPHALOGRAM REPORT      Referring provider: Dr. Mon    DOS: 04/26/23 (total recording of 25 minutes).     INDICATION:  Prashant Russo Jr. 52 y.o. male presenting with confusion     CURRENT ANTIEPILEPTIC REGIMEN: none     TECHNIQUE: 30 channel video electroencephalogram (EEG) was performed in accordance with the international 10-20 system. The study was reviewed in bipolar and referential montages. The recording examined the patient during   awake, drowsy and sleep states    DESCRIPTION OF THE RECORD:  During the wakefulness, the background showed a symmetrical 10 Hz alpha activity posteriorly with amplitude of 70 mV.  There was reactivity to eye closure/opening.  A normal anterior-posterior gradient was noted with faster beta frequencies seen anteriorly.  During drowsiness, increased theta/beta frequencies were seen. During the brief sleep state,symmetrical sleep spindles and vertex sharps were seen in the leads over the central regions. No slow wave stage seen.     ACTIVATION PROCEDURES:     Hyperventilation was performed by the patient for a total of 3 minutes. The technician performing the test noted good effort. No physiological build up seen.     Intermittent Photic stimulation was performed in a stepwise fashion from 1 to 30 Hz and elicited no photic driving response.     ICTAL AND/OR INTERICTAL FINDINGS:   No focal or generalized epileptiform activity noted. No regional slowing was seen during this routine study.  No clinical events or seizures were reported or recorded during the study.     EKG: sampling of the EKG recording demonstrated sinus rhythm.     EVENTS: none     INTERPRETATION:    This is a normal video EEG recording in the awake, drowsy and sleep states.   Note: A normal EEG does not rule out epilepsy.  If the clinical suspicion remains high for seizures, a prolonged recording to capture clinical or subclinical events may be helpful.    Emeka Buchanna MD  Diplomate in  Neurology&Epilepsy  Office: [phone]  Fax: [phone]

## 2023-04-26 NOTE — THERAPY
Speech Language Pathology   Cognitive Evaluation     Patient Name: Prashant Russo Jr.  AGE:  52 y.o., SEX:  male  Medical Record #: [id number]  Date of Service: 4/26/2023      History of Present Illness  The pt is a 53 y/o M who was admitted under observation 4/25/23. The pt presented with confusion and amnesia.      PMHx: nephritic syndrome    CXR 4/25 -  1.  There is no acute cardiopulmonary process.    CTH 4/25 -  No acute intracranial abnormality.    MRI - pending    General Information  Vitals  O2 (LPM): 0  O2 Delivery Device: Room air w/o2 available  Level of Consciousness: Alert, Awake  Orientation: Oriented x 4  Follows Directives: Yes      Prior Living Situation & Level of Function  Prior Services: Home-Independent  Housing / Facility: [address]  Lives with - Patient's Self Care Capacity: Spouse   Communication: Pt reported ongoing memory deficits, more prominent with STM vs LTM, improved since admission. Pt works full time as a .       Subjective  RN cleared the pt for speech tx, no acute changes reported. Pt was received awake and alert. He was pleasant and cooperative. Pt's wife was present at b/s.      Communication Domain(s)  Cognitive-Linguistic: Mild     Assessment  The patient was seen this date for a cognitive evaluation. Portions of the Cognistat and other informal measures were utilized. Results are as follows:      Cognistat  Orientation: Average  Attention: Average  Comprehension: Average  Repetition: Average  Naming: Average  Memory: Mild  Calculations: Average  Similarities: Mild  Judgement: Average    Medication Management  Medication Management: Pt demonstrated mild difficulty with calculating timing of doses, including incorrectly identifying AM vs PM.        Clinical Impressions  The pt presents with mild cognitive deficits in the areas of memory, similarities and medication management. Expressive language was fluent c/b occasional paraphasias that patient was inconsistently  "aware of.       NOTE: It is not within the scope of practice of Speech-Language Pathologists to determine patient capacity. Please defer to the physician or psych to complete this assessment.       Recommendations  Supervision Needs Upons Discharge: Intermittent assistance with IADLs (see below)  IADLs: Medication management, Financial management, Appointment management    SLP to complete a CSE if clinically indicated. Pt is currently consuming a regular diet without any concerns. Will f/u pending MRI results.    Results and recs d/w pt, wife and RN. Thank you.         SLP Treatment Plan  Treatment Plan: Cognitive Treatment  SLP Frequency: 2x Per Week  Estimated Duration: Until Therapy Goals Met      Anticipated Discharge Needs  Discharge Recommendations: Recommend outpatient speech therapy services  Therapy Recommendations Upon DC: Cognitive-Linguistic Training      Patient / Family Goals  Patient / Family Goal #1: \"I'm having trouble with my memory\"  Short Term Goal # 1: Pt will recall 4 items after 5 minutes given modA.      Conchita Zavaleta, MAEGAN  "

## 2023-04-26 NOTE — HOSPITAL COURSE
"Per notes, \"52 y.o. male who presented 4/25/2023 with confusion and amnesia. The patient reports that he was at work and suddenly developed a strange feeling and was confusion. Symptoms started around 1:30 PM. He denies having noticing and focal weakness or sensory changes. Denies noticing any abnormal jerky movement of extremities. Denies noticing tongue biting, fecal or urinary incontinence.\"    Patient was admitted and monitored overnight for confusion of this lesion.  He states he is still not feeling back to baseline.  Focal neurological findings on exam and symptoms seem to be overall improving.  Extensive work-up for stroke rule out was completed in the hospital.  He also had a negative tox screen.CTA of head showed mild bilateral atherosclerosis less than 50% ICA stenosis and underlying chronic sinus disease.  CT of head with no acute intracranial abnormality.  Of note, patient does have a total cholesterol of 280 and triglycerides of 557.  Patient is apparently taking cholesterol medications but does admit to missing a dose occasionally.   "

## 2023-04-26 NOTE — ED NOTES
Urine collected and sent to lab;    Pt and spouse both denied having any needs at this time, no distress noted;

## 2023-04-26 NOTE — ED PROVIDER NOTES
"ER Provider Note    Scribed for Cody Waldrop D.O. by Grant Lopez. 4/25/2023  5:56 PM    Primary Care Provider: Shad St P.A.-C.    CHIEF COMPLAINT  Chief Complaint   Patient presents with    ALOC     Patient AxO3 unable to tell time. Patient has had severe episode of amnesia like for this morning.        HPI/ROS  LIMITATION TO HISTORY   ALOC/Confusion  OUTSIDE HISTORIAN(S):  Wife at bedside to confirm sequence of events and collateral information as detailed below. She provided a significant portion of the history of present illness due to his confusion and loss of memory regarding earlier events.    Prashant Russo Jr. is a 52 y.o. male who presents to the Emergency Department for evaluation of for evaluation of confusion onset this afternoon at approximately 1330. The patient denies any head pain. His wife at bedside indicates that he had a severe episode of amnesia this morning while at work. He works as a , and was unable to remember how to perform tasks that he does frequently including setting up equipment and opening a door with a code. He notes that the current year is 2023 and the  is Daniel. He is unable to determine what month it is, but he states it is the start of summer. She notes that he played with his grandchildren this morning, but he does not remember this. He states he does not remember anything since he left for work this morning. She states he \"hits his head on things all the time\" and \"his head is all beat up\". No alleviating or exacerbating factors were noted. He admits to occasional recreational alcohol and marijuana use, but he denies using either of these substances today. He has a family history of cardiac issues, and diabetes, but denies any history of strokes. He denies any history of seizures.    ROS as per HPI.    PAST MEDICAL HISTORY  Past Medical History:   Diagnosis Date    Dental disorder     chipped tooth, crowns needed    " "Nephritic syndrome     as child, unknown as adult     SURGICAL HISTORY  Past Surgical History:   Procedure Laterality Date    HIP ARTHROSCOPY  3/2/2015    Performed by Nilton Costa M.D. at SURGERY [facility] ORS    FEMORAL NECK OSTEOPLASTY  3/2/2015    Performed by Nilton Costa M.D. at SURGERY [facility]    ACETABULAR OSTEOPLASTY  3/2/2015    Performed by Nilton Costa M.D. at SURGERY [facility]     FAMILY HISTORY  Family History   Problem Relation Age of Onset    Diabetes Father     Hypertension Father     Heart Disease Father     Stroke Maternal Grandmother     Cancer Paternal Grandmother      SOCIAL HISTORY   reports that he has never smoked. His smokeless tobacco use includes chew. He reports current alcohol use. He reports current drug use.    CURRENT MEDICATIONS  Previous Medications    IBUPROFEN (MOTRIN) 200 MG TABS    Take 200 mg by mouth every 6 hours as needed.    MULTIPLE VITAMINS-MINERALS (MULTIVITAMIN PO)    Take  by mouth every day.     ALLERGIES  Patient has no known allergies.    PHYSICAL EXAM  /88   Pulse 83   Temp 36.2 °C (97.1 °F) (Temporal)   Resp 16   Ht 1.803 m (5' 11\")   Wt 99.8 kg (220 lb 0.3 oz)   SpO2 97%   BMI 30.69 kg/m²     General: No acute distress.  HENT: Normocephalic, Mucus membranes are moist. No signs of head trauma, hematoma, or abrasions.  Chest: Lungs have even and unlabored respirations, Clear to auscultation.   Cardiovascular: Regular rate and regular rhythm, No peripheral cyanosis.  Abdomen: Non distended.  Neuro: Awake, Alert, Follows commands and answers questions, Speech is clear and concise. Oriented to person and place. Knows the year but not the month, confused to even time of year. Memory loss of events of the day. Strength coordination and sensation in all 4 extremities is equal and normal. Conversational.  Psychiatric: Calm and cooperative.     EXTERNAL RECORDS REVIEWED  No previous visits for mental status changes. "     INITIAL ASSESSMENT  Patient has memory loss of the events of the day. He is confused to year. There is concerns for seizure with postictal period, intoxication, and stkoe. Patient will be brought STAT for CT-CTA to evaluate for stroke. I spoke with CT scan who will do STAT imaging.    ED Observation Status? Yes; I am placing the patient in to an observation status due to a diagnostic uncertainty as well as therapeutic intensity. Patient placed in observation status at 5:57 PM, 4/25/2023.     Observation plan is as follows: Monitor mental status changes while studies are pending.    Upon Reevaluation, the patient's condition has: not improved; and will be escalated to hospitalization.    Patient discharged from ED Observation status at 8:28 PM (Time) 4/25/2023 (Date).     DIAGNOSTIC STUDIES    Labs:   Results for orders placed or performed during the hospital encounter of 04/25/23   CBC with Differential   Result Value Ref Range    WBC 7.1 4.8 - 10.8 K/uL    RBC 5.32 4.70 - 6.10 M/uL    Hemoglobin 14.3 14.0 - 18.0 g/dL    Hematocrit 43.8 42.0 - 52.0 %    MCV 82.3 81.4 - 97.8 fL    MCH 26.9 (L) 27.0 - 33.0 pg    MCHC 32.6 (L) 33.7 - 35.3 g/dL    RDW 41.9 35.9 - 50.0 fL    Platelet Count 162 (L) 164 - 446 K/uL    MPV 11.0 9.0 - 12.9 fL    Neutrophils-Polys 69.10 44.00 - 72.00 %    Lymphocytes 22.80 22.00 - 41.00 %    Monocytes 4.80 0.00 - 13.40 %    Eosinophils 2.50 0.00 - 6.90 %    Basophils 0.40 0.00 - 1.80 %    Immature Granulocytes 0.40 0.00 - 0.90 %    Nucleated RBC 0.00 /100 WBC    Neutrophils (Absolute) 4.92 1.82 - 7.42 K/uL    Lymphs (Absolute) 1.62 1.00 - 4.80 K/uL    Monos (Absolute) 0.34 0.00 - 0.85 K/uL    Eos (Absolute) 0.18 0.00 - 0.51 K/uL    Baso (Absolute) 0.03 0.00 - 0.12 K/uL    Immature Granulocytes (abs) 0.03 0.00 - 0.11 K/uL    NRBC (Absolute) 0.00 K/uL   Comp Metabolic Panel   Result Value Ref Range    Sodium 140 135 - 145 mmol/L    Potassium 4.1 3.6 - 5.5 mmol/L    Chloride 102 96 - 112  mmol/L    Co2 26 20 - 33 mmol/L    Anion Gap 12.0 7.0 - 16.0    Glucose 86 65 - 99 mg/dL    Bun 15 8 - 22 mg/dL    Creatinine 0.70 0.50 - 1.40 mg/dL    Calcium 9.6 8.4 - 10.2 mg/dL    AST(SGOT) 56 (H) 12 - 45 U/L    ALT(SGPT) 61 (H) 2 - 50 U/L    Alkaline Phosphatase 89 30 - 99 U/L    Total Bilirubin 0.5 0.1 - 1.5 mg/dL    Albumin 4.5 3.2 - 4.9 g/dL    Total Protein 7.1 6.0 - 8.2 g/dL    Globulin 2.6 1.9 - 3.5 g/dL    A-G Ratio 1.7 g/dL   Urine Drug Screen (Triage)   Result Value Ref Range    Amphetamines Urine Negative Negative    Barbiturates Negative Negative    Benzodiazepines Negative Negative    Cocaine Metabolite Negative Negative    Methadone Negative Negative    Opiates Negative Negative    Oxycodone Negative Negative    Phencyclidine -Pcp Negative Negative    Propoxyphene Negative Negative    Cannabinoid Metab Negative Negative   AMMONIA   Result Value Ref Range    Ammonia 14 11 - 45 umol/L   PROTHROMBIN TIME   Result Value Ref Range    PT 13.6 12.0 - 14.6 sec    INR 1.05 0.87 - 1.13   APTT   Result Value Ref Range    APTT 30.3 24.7 - 36.0 sec   COD (ADULT)   Result Value Ref Range    ABO Grouping Only O     Rh Grouping Only POS     Antibody Screen-Cod NEG    TROPONIN   Result Value Ref Range    Troponin T 7 6 - 19 ng/L   ETHYL ALCOHOL (BLOOD)   Result Value Ref Range    Diagnostic Alcohol <10.1 <10.1 mg/dL   ABO Rh Confirm   Result Value Ref Range    ABO Rh Confirm O POS    CORRECTED CALCIUM   Result Value Ref Range    Correct Calcium 9.2 8.5 - 10.5 mg/dL   ESTIMATED GFR   Result Value Ref Range    GFR (CKD-EPI) 110 >60 mL/min/1.73 m 2   EKG (NOW)   Result Value Ref Range    Report       [facility] Emergency Dept.    Test Date:  2023  Pt Name:    REID SALOMON                   Department: [facility]  MRN:        [id number]                      [facility]  Gender:     Male                         Technician: [id number]  :        1970                   Requested By:LOKESH  KANDY MAHONEY  Order #:    [id number]                    Reading MD: LOKESH MAHONEY D.O.    Measurements  Intervals                                Axis  Rate:       76                           P:          58  AK:         177                          QRS:        21  QRSD:       90                           T:          16  QT:         377  QTc:        424    Interpretive Statements  Sinus rhythm  No previous ECG available for comparison  Electronically Signed On 2023 20:13:38 PDT by LOKESH MAHONEY D.O.     POCT glucose device results   Result Value Ref Range    POC Glucose, Blood 108 (H) 65 - 99 mg/dL     EK Lead EKG interpreted by me as above.    Radiology:   The attending emergency physician has independently interpreted the diagnostic imaging associated with this visit and am waiting the final reading from the radiologist.   Preliminary interpretation is as follows: CT shows no bleed.  Radiologist interpretation:   DX-CHEST-PORTABLE (1 VIEW)   Final Result      1.  There is no acute cardiopulmonary process.      CT-CTA NECK WITH & W/O-POST PROCESSING   Final Result      1.  Mild bilateral atherosclerosis with less than 50% ICA stenosis.   2.  There is underlying chronic sinus disease.      CT-CTA HEAD WITH & W/O-POST PROCESS   Final Result      1.  CT angiogram of the [place] demonstrating no large vessel occlusion.      CT-HEAD W/O   Final Result      No acute intracranial abnormality.                  CT-CEREBRAL PERFUSION ANALYSIS   Final Result      1.  Cerebral blood flow less than 30% likely representing completed infarct = 0 mL.      2.  T Max more than 6 seconds likely representing combination of completed infarct and ischemia = 0 mL.      3.  Mismatched volume likely representing ischemic brain/penumbra = None      4.  Please note that the cerebral perfusion was performed on the limited brain tissue around the basal ganglia region. Infarct/ischemia outside the CT perfusion sections  can be missed in this study.      MR-BRAIN-W/O    (Results Pending)   EC-ECHOCARDIOGRAM COMPLETE W/ CONT    (Results Pending)     COURSE & MEDICAL DECISION MAKING     COURSE AND PLAN  5:56 PM - Patient seen and examined at bedside. Discussed plan of care, including plan to order a series of labs and imaging studies. Patient agrees to the plan of care. Ordered for CT-Head w/o, CT-Cerebral Perfusion Analysis, CT-CTA Head w/ & w/o post process, CT-CTA Neck w/ & w/o post process, DX-Chest, Ammonia, Troponin, COD, APTT, PTT, POCT Glucose, CBC w/diff, CMP, Diagnostic Alcohol, POCT UA, Ethyl Alcohol, and UDS to evaluate his symptoms. Differential diagnoses include but not limited to: Cerebral hemorrhage, Stroke, and Post-ictal phase.    7:26 PM - Patient was reevaluated at bedside. The patient's wife states he is still confused and asking questions repetitively. Discussed lab and radiology results with the patient and wife and informed them that no acute abnormalities were immediately noted, and he will need to stay in the hospital pending EEG and possible MRI. I informed the patient of my plan to admit today given the patient's current presentation and diagnostic study results. The patient was given an opportunity to ask questions. Patient verbalizes understanding and support with my plan for admission.   [place] Hospitalist.    8:28 PM - I discussed the patient's case and the above findings with Dr. Stack (Hospitalist) who agrees to evaluate the patient for hospitalization.     ED Summary: Patient presents with amnesia, stroke evaluation shows NIH stroke scale of 1. He is confused just to year. He has very poor memory of recent events. He has no signs of trauma. Stroke protocol with CTs and labs was initiated. No signs of stroke. On reevaluation he remains with amnesia, this may be post-ictal. He has no history of seizures. He will be admitted for continued evaluation and treatment.    Decision tools and prescription  drugs considered including, but not limited to: NIH Stroke Scale: 1. [place] CT Rule: Unable to rule out ICH so CT ordered.    DISPOSITION AND DISCUSSIONS  I have discussed management of the patient with the following physicians and JOAO's:  Dr. Stack (Hospitalist).    DISPOSITION:  Patient will be hospitalized by Dr. Stack (Hospitalist) in guarded condition.    FINAL DIAGNOSIS  1. Altered mental status, unspecified altered mental status type    2. Amnesia       Grant WESTON (Scribe), am scribing for, and in the presence of, Cody Waldrop D.O..    Electronically signed by: Grant Lopez (Scribe), 4/25/2023    Cody WESTON D.O. personally performed the services described in this documentation, as scribed by Grant Lopez in my presence, and it is both accurate and complete.    The note accurately reflects work and decisions made by me.  Cody Waldrop D.O.  4/25/2023  9:39 PM

## 2023-04-26 NOTE — H&P
Hospital Medicine History & Physical Note    Date of Service  4/25/2023    Primary Care Physician  Shad St P.A.-C.    Consultants  None     Code Status  Full Code    Chief Complaint  Chief Complaint   Patient presents with    ALOC     Patient AxO3 unable to tell time. Patient has had severe episode of amnesia like for this morning.      History of Presenting Illness  Prashant Russo Jr. is a 52 y.o. male who presented 4/25/2023 with confusion and amnesia. The patient reports that he was at work and suddenly developed a strange feeling and was confusion. Symptoms started around 1:30 PM. He denies having noticing and focal weakness or sensory changes. Denies noticing any abnormal jerky movement of extremities. Denies noticing tongue biting, fecal or urinary incontinence.       I discussed the plan of care with patient.    Review of Systems  Review of Systems   Constitutional:  Negative for chills and fever.   Eyes:  Negative for discharge and redness.   Respiratory:  Negative for cough, shortness of breath and stridor.    Cardiovascular:  Negative for chest pain and leg swelling.   Gastrointestinal:  Negative for abdominal pain and vomiting.   Genitourinary:  Negative for flank pain.   Musculoskeletal:  Negative for myalgias.   Skin: Negative.    Neurological:  Positive for dizziness. Negative for focal weakness.        Transient confusion    Endo/Heme/Allergies:  Does not bruise/bleed easily.   Psychiatric/Behavioral:  The patient is not nervous/anxious.      Past Medical History   has a past medical history of Dental disorder and Nephritic syndrome.    Surgical History   has a past surgical history that includes hip arthroscopy (3/2/2015); femoral neck osteoplasty (3/2/2015); and acetabular osteoplasty (3/2/2015).     Family History  family history includes Cancer in his paternal grandmother; Diabetes in his father; Heart Disease in his father; Hypertension in his father; Stroke in his maternal grandmother.       Social History   reports that he has never smoked. His smokeless tobacco use includes chew. He reports current alcohol use. He reports current drug use.    Allergies  No Known Allergies    Medications  Prior to Admission Medications   Prescriptions Last Dose Informant Patient Reported? Taking?   Multiple Vitamins-Minerals (MULTIVITAMIN PO)   Yes No   Sig: Take  by mouth every day.   ibuprofen (MOTRIN) 200 MG TABS   Yes No   Sig: Take 200 mg by mouth every 6 hours as needed.      Facility-Administered Medications: None     Physical Exam  Temp:  [36.2 °C (97.1 °F)] 36.2 °C (97.1 °F)  Pulse:  [73-85] 83  Resp:  [16-20] 19  BP: ()/(65-88) 116/65  SpO2:  [96 %-97 %] 97 %  Blood Pressure: 116/65   Temperature: 36.2 °C (97.1 °F)   Pulse: 83   Respiration: 19   Pulse Oximetry: 97 %     Physical Exam  Constitutional:       General: He is not in acute distress.     Appearance: He is not ill-appearing or diaphoretic.   HENT:      Head: Atraumatic.      Right Ear: External ear normal.      Left Ear: External ear normal.      Nose: No congestion or rhinorrhea.      Mouth/Throat:      Mouth: Mucous membranes are moist.   Eyes:      General: No scleral icterus.        Right eye: No discharge.         Left eye: No discharge.      Pupils: Pupils are equal, round, and reactive to light.   Cardiovascular:      Rate and Rhythm: Normal rate and regular rhythm.   Pulmonary:      Effort: Pulmonary effort is normal.   Abdominal:      General: There is no distension.      Tenderness: There is abdominal tenderness (Mild, RUQ, negative cha).   Musculoskeletal:      Cervical back: Neck supple. No rigidity. No muscular tenderness.      Right lower leg: No edema.      Left lower leg: No edema.   Skin:     Coloration: Skin is not jaundiced or pale.   Neurological:      Mental Status: He is alert and oriented to person, place, and time.      Coordination: Coordination normal.   Psychiatric:         Mood and Affect: Mood normal.          Behavior: Behavior normal.     Laboratory:  Recent Labs     04/25/23  1755   WBC 7.1   RBC 5.32   HEMOGLOBIN 14.3   HEMATOCRIT 43.8   MCV 82.3   MCH 26.9*   MCHC 32.6*   RDW 41.9   PLATELETCT 162*   MPV 11.0     Recent Labs     04/25/23  1755   SODIUM 140   POTASSIUM 4.1   CHLORIDE 102   CO2 26   GLUCOSE 86   BUN 15   CREATININE 0.70   CALCIUM 9.6     Recent Labs     04/25/23  1755   ALTSGPT 61*   ASTSGOT 56*   ALKPHOSPHAT 89   TBILIRUBIN 0.5   GLUCOSE 86     Recent Labs     04/25/23  1755   APTT 30.3   INR 1.05     No results for input(s): NTPROBNP in the last 72 hours.      Recent Labs     04/25/23  1755   TROPONINT 7     Imaging:  DX-CHEST-PORTABLE (1 VIEW)   Final Result      1.  There is no acute cardiopulmonary process.      CT-CTA NECK WITH & W/O-POST PROCESSING   Final Result      1.  Mild bilateral atherosclerosis with less than 50% ICA stenosis.   2.  There is underlying chronic sinus disease.      CT-CTA HEAD WITH & W/O-POST PROCESS   Final Result      1.  CT angiogram of the [place] demonstrating no large vessel occlusion.      CT-HEAD W/O   Final Result      No acute intracranial abnormality.                  CT-CEREBRAL PERFUSION ANALYSIS   Final Result      1.  Cerebral blood flow less than 30% likely representing completed infarct = 0 mL.      2.  T Max more than 6 seconds likely representing combination of completed infarct and ischemia = 0 mL.      3.  Mismatched volume likely representing ischemic brain/penumbra = None      4.  Please note that the cerebral perfusion was performed on the limited brain tissue around the basal ganglia region. Infarct/ischemia outside the CT perfusion sections can be missed in this study.      MR-BRAIN-W/O    (Results Pending)   EC-ECHOCARDIOGRAM COMPLETE W/ CONT    (Results Pending)     Assessment/Plan:  Justification for Admission Status  I anticipate this patient is appropriate for observation status at this time because likely discharge after one  midnight     Patient will need a Telemetry bed on NEUROLOGY service .  Stroke workup     * Altered mental status- (present on admission)  Assessment & Plan  Admit to Neuro, with continuous cardiac monitoring  CT, CT-angiography, head, neck showed no acute infarction, or hemorrhage    Echo ordered  NPO, till screened by speech  Aspiration, Fall, and seizure precautions    Neuro checks   Speech, Physical, occupational therapy evaluation ordered  Physiatry consult placed   MRI brain     Elevated liver enzymes- (present on admission)  Assessment & Plan  Likely related to alcohol use   No abd pain. Has minimal RUQ tenderness  Continue to montior   I will check acute hepatitis panel to rule out other potential causes.  I will check RUQ US   Continue to monitor, avoid/minimize hepatotoxins as much as possible.      Stenosis of both internal carotid arteries- (present on admission)  Assessment & Plan  Mild bilateral atherosclerosis with less than 50% ICA stenosis  I will start aspirin  Consider starting high intensity statin based on hepatitis panel and RUQ US      Amnesia- (present on admission)  Assessment & Plan  Will check MRI brain  Will check EEG. ? Post ictal amnesia     VTE prophylaxis: SCDs/TEDs and Xarelto 10 mg daily as prophylaxis

## 2023-04-27 VITALS
RESPIRATION RATE: 18 BRPM | TEMPERATURE: 97.5 F | SYSTOLIC BLOOD PRESSURE: 116 MMHG | BODY MASS INDEX: 30.43 KG/M2 | HEIGHT: 71 IN | DIASTOLIC BLOOD PRESSURE: 71 MMHG | WEIGHT: 217.37 LBS | OXYGEN SATURATION: 93 % | HEART RATE: 69 BPM

## 2023-04-27 PROCEDURE — G0378 HOSPITAL OBSERVATION PER HR: HCPCS

## 2023-04-27 PROCEDURE — 94760 N-INVAS EAR/PLS OXIMETRY 1: CPT

## 2023-04-27 PROCEDURE — 99239 HOSP IP/OBS DSCHRG MGMT >30: CPT | Performed by: INTERNAL MEDICINE

## 2023-04-27 RX ORDER — ASPIRIN 81 MG/1
81 TABLET, CHEWABLE ORAL DAILY
Status: CANCELLED | OUTPATIENT
Start: 2023-04-27

## 2023-04-27 RX ORDER — ATORVASTATIN CALCIUM 40 MG/1
80 TABLET, FILM COATED ORAL EVERY EVENING
Status: CANCELLED | OUTPATIENT
Start: 2023-04-27

## 2023-04-27 RX ORDER — ATORVASTATIN CALCIUM 40 MG/1
80 TABLET, FILM COATED ORAL NIGHTLY
Qty: 30 TABLET | Refills: 0 | Status: SHIPPED | OUTPATIENT
Start: 2023-04-27

## 2023-04-27 RX ORDER — CLOPIDOGREL BISULFATE 75 MG/1
75 TABLET ORAL DAILY
Qty: 30 TABLET | Refills: 0 | Status: SHIPPED | OUTPATIENT
Start: 2023-04-27 | End: 2023-05-27

## 2023-04-27 RX ORDER — ASPIRIN 300 MG/1
300 SUPPOSITORY RECTAL DAILY
Status: CANCELLED | OUTPATIENT
Start: 2023-04-27

## 2023-04-27 RX ORDER — ASPIRIN 81 MG/1
81 TABLET ORAL DAILY
Qty: 100 TABLET | Refills: 0 | Status: SHIPPED | OUTPATIENT
Start: 2023-04-27

## 2023-04-27 ASSESSMENT — ENCOUNTER SYMPTOMS: MEMORY LOSS: 1

## 2023-04-27 ASSESSMENT — FIBROSIS 4 INDEX: FIB4 SCORE: 1.37

## 2023-04-27 ASSESSMENT — PAIN DESCRIPTION - PAIN TYPE: TYPE: ACUTE PAIN

## 2023-04-27 NOTE — PROGRESS NOTES
"Hospital Medicine Daily Progress Note    Date of Service  4/27/2023    Chief Complaint  Prashant Russo Jr. is a 52 y.o. male admitted 4/25/2023 with confusion     Hospital Course  Per notes, \"52 y.o. male who presented 4/25/2023 with confusion and amnesia. The patient reports that he was at work and suddenly developed a strange feeling and was confusion. Symptoms started around 1:30 PM. He denies having noticing and focal weakness or sensory changes. Denies noticing any abnormal jerky movement of extremities. Denies noticing tongue biting, fecal or urinary incontinence.\"    Patient was admitted and monitored overnight for confusion of this lesion.  He states he is still not feeling back to baseline.  Focal neurological findings on exam and symptoms seem to be overall improving.  Extensive work-up for stroke rule out was completed in the hospital.  He also had a negative tox screen.CTA of head showed mild bilateral atherosclerosis less than 50% ICA stenosis and underlying chronic sinus disease.  CT of head with no acute intracranial abnormality.  Of note, patient does have a total cholesterol of 280 and triglycerides of 557.  Patient is apparently taking cholesterol medications but does admit to missing a dose occasionally.     Interval Problem Update  Patient still feeling \"foggy \"  Pending MRI-suspicion for stroke    I have discussed this patient's plan of care and discharge plan at IDT rounds today with Case Management, Nursing, Nursing leadership, and other members of the IDT team.    Consultants/Specialty  none    Code Status  Full Code    Disposition  Patient is not medically cleared for discharge.   Anticipate discharge to to home with close outpatient follow-up.  I have placed the appropriate orders for post-discharge needs.    Review of Systems  Review of Systems   Psychiatric/Behavioral:  Positive for memory loss.    All other systems reviewed and are negative.     Physical Exam  Temp:  [36.4 °C (97.5 " °F)-36.7 °C (98.1 °F)] 36.4 °C (97.5 °F)  Pulse:  [60-76] 69  Resp:  [16-18] 18  BP: ()/(60-79) 116/71  SpO2:  [92 %-96 %] 93 %    Physical Exam  Vitals and nursing note reviewed.   Constitutional:       Appearance: Normal appearance.   Cardiovascular:      Rate and Rhythm: Normal rate and regular rhythm.      Pulses: Normal pulses.      Heart sounds: Normal heart sounds.   Pulmonary:      Effort: Pulmonary effort is normal.      Breath sounds: Normal breath sounds.   Abdominal:      General: Abdomen is flat. Bowel sounds are normal.      Palpations: Abdomen is soft.   Musculoskeletal:      Right lower leg: No edema.      Left lower leg: No edema.   Neurological:      General: No focal deficit present.      Mental Status: He is alert and oriented to person, place, and time. Mental status is at baseline.       Fluids  No intake or output data in the 24 hours ending 04/27/23 1220    Laboratory  Recent Labs     04/25/23  1755 04/26/23  0123   WBC 7.1 6.3   RBC 5.32 5.08   HEMOGLOBIN 14.3 13.7*   HEMATOCRIT 43.8 41.5*   MCV 82.3 81.7   MCH 26.9* 27.0   MCHC 32.6* 33.0*   RDW 41.9 42.1   PLATELETCT 162* 151*   MPV 11.0 11.0     Recent Labs     04/25/23  1755 04/26/23  0123   SODIUM 140 138   POTASSIUM 4.1 3.5*   CHLORIDE 102 102   CO2 26 23   GLUCOSE 86 149*   BUN 15 13   CREATININE 0.70 0.75   CALCIUM 9.6 9.1     Recent Labs     04/25/23  1755   APTT 30.3   INR 1.05         Recent Labs     04/26/23  0123   TRIGLYCERIDE 557*   HDL 32*   LDL see below       Imaging  MR-BRAIN-W/O   Final Result      1.  Punctate focus of restricted diffusion in the body of the left hippocampus most consistent with an acute lacunar size infarct. This could certainly represent the etiology for acute or transient amnesia.   2.  Remainder of study is within normal limits with no other areas of abnormal signal or restricted diffusion elsewhere in the cerebral hemispheres or posterior fossa.      EC-ECHOCARDIOGRAM COMPLETE W/O CONT   Final  Result      US-RUQ   Final Result         1.  Echogenic pancreas, consider fatty or other metabolic infiltration.   2.  Hepatomegaly and echogenic liver, compatible with fatty change versus fibrosis.   3.  Hypoechoic area along the gallbladder fossa, location and appearance favors focal fatty sparing.   4.  Borderline gallbladder wall thickening without visualized shadowing stones, consider acalculous cholecystitis. Could be further evaluated with HIDA scan as clinically appropriate.      DX-CHEST-PORTABLE (1 VIEW)   Final Result      1.  There is no acute cardiopulmonary process.      CT-CTA NECK WITH & W/O-POST PROCESSING   Final Result      1.  Mild bilateral atherosclerosis with less than 50% ICA stenosis.   2.  There is underlying chronic sinus disease.      CT-CTA HEAD WITH & W/O-POST PROCESS   Final Result      1.  CT angiogram of the [place] demonstrating no large vessel occlusion.      CT-HEAD W/O   Final Result      No acute intracranial abnormality.                  CT-CEREBRAL PERFUSION ANALYSIS   Final Result      1.  Cerebral blood flow less than 30% likely representing completed infarct = 0 mL.      2.  T Max more than 6 seconds likely representing combination of completed infarct and ischemia = 0 mL.      3.  Mismatched volume likely representing ischemic brain/penumbra = None      4.  Please note that the cerebral perfusion was performed on the limited brain tissue around the basal ganglia region. Infarct/ischemia outside the CT perfusion sections can be missed in this study.           Assessment/Plan  * Altered mental status- (present on admission)  Assessment & Plan  Admit to Neuro, with continuous cardiac monitoring  CT, CT-angiography, head, neck showed no acute infarction, or hemorrhage    Echo ordered  NPO, till screened by speech  Aspiration, Fall, and seizure precautions    Neuro checks   Physical, occupational therapy evaluated pt- no needs  Physiatry consult placed   MRI brain  pending     Elevated liver enzymes- (present on admission)  Assessment & Plan  Likely related to alcohol use   No abd pain. Has minimal RUQ tenderness  Continue to montior   I will check acute hepatitis panel to rule out other potential causes.  I will check RUQ US   Continue to monitor, avoid/minimize hepatotoxins as much as possible.      Stenosis of both internal carotid arteries- (present on admission)  Assessment & Plan  Mild bilateral atherosclerosis with less than 50% ICA stenosis  I will start aspirin  Consider starting high intensity statin based on hepatitis panel and RUQ US      Amnesia- (present on admission)  Assessment & Plan  Will check MRI brain-pending  Will check EEG. ? Post ictal amnesia   EEG not suggestive of seizures         VTE prophylaxis: SCDs/TEDs    I have performed a physical exam and reviewed and updated ROS and Plan today (4/27/2023). In review of yesterday's note (4/26/2023), there are no changes except as documented above.

## 2023-04-27 NOTE — PROGRESS NOTES
Telemetry Shift Summary     Rhythm: SR  Rate: 60s-70s  Measurements: 0.18/0.08/0.40  Ectopy (reported by Monitor Tech): rare PVC, rare Couplet     Normal Values  Rhythm: Sinus  HR:   Measurements: 0.12-0.20/0.06-0.10/0.30-0.52

## 2023-04-27 NOTE — CARE PLAN
The patient is Stable - Low risk of patient condition declining or worsening    Shift Goals  Clinical Goals: MRI, M1Wfhnt Checks  Patient Goals: Comfort    Progress made toward(s) clinical / shift goals:    Problem: Mobility - Stroke  Goal: Patient's capacity to carry out activities will improve  Outcome: Progressing     Problem: Self Care  Goal: Patient will have the ability to perform ADLs independently or with assistance (bathe, groom, dress, toilet and feed)  Outcome: Progressing     Problem: Knowledge Deficit - Standard  Goal: Patient and family/care givers will demonstrate understanding of plan of care, disease process/condition, diagnostic tests and medications  Outcome: Progressing     Problem: Pain - Standard  Goal: Alleviation of pain or a reduction in pain to the patient’s comfort goal  Outcome: Progressing       Patient is not progressing towards the following goals:

## 2023-04-27 NOTE — CARE PLAN
The patient is Stable - Low risk of patient condition declining or worsening    Shift Goals  Clinical Goals: discharge  Patient Goals: discharge    Progress made toward(s) clinical / shift goals:  patient cleared to discharge with neurology follow-up and Zio patch.       Problem: Optimal Care of the Stroke Patient  Goal: Optimal emergency care for the stroke patient  Outcome: Met     Problem: Knowledge Deficit - Standard  Goal: Patient and family/care givers will demonstrate understanding of plan of care, disease process/condition, diagnostic tests and medications  Outcome: Met     Problem: Pain - Standard  Goal: Alleviation of pain or a reduction in pain to the patient’s comfort goal  Outcome: Met       Patient is not progressing towards the following goals:

## 2023-04-27 NOTE — DISCHARGE PLANNING
Case Management Discharge Planning    Admission Date: 4/25/2023  GMLOS:    ALOS: 0    6-Clicks ADL Score: 24  6-Clicks Mobility Score: 24    Anticipated Discharge Dispo:  Discharged to home/self care (01)    DME Needed: No    Action(s) Taken: Updated Provider/Nurse on Discharge Plan  SW attended morning IDT rounds with team. Patient is not medically cleared for discharge at this time. No anticipated discharge needs identified.    SW requested to look into insurance coverage. HANNAH spoke to patient who report he does not have insurance. Message sent to [facility] [place] for medicaid screening.     Escalations Completed: None    Medically Clear: No    Next Steps:     Barriers to Discharge: Medical clearance    Is the patient up for discharge tomorrow: No

## 2023-04-27 NOTE — DISCHARGE SUMMARY
"Discharge Summary    CHIEF COMPLAINT ON ADMISSION  Chief Complaint   Patient presents with    ALOC     Patient AxO3 unable to tell time. Patient has had severe episode of amnesia like for this morning.        Reason for Admission  Confusion     Admission Date  4/25/2023    CODE STATUS  Full Code    HPI & HOSPITAL COURSE  Per notes, \"52 y.o. male who presented 4/25/2023 with confusion and amnesia. The patient reports that he was at work and suddenly developed a strange feeling and was confusion. Symptoms started around 1:30 PM. He denies having noticing and focal weakness or sensory changes. Denies noticing any abnormal jerky movement of extremities. Denies noticing tongue biting, fecal or urinary incontinence.\"    Patient was admitted and monitored overnight for confusion of this lesion.  He states he is still not feeling back to baseline.  Focal neurological findings on exam and symptoms seem to be overall improving.  Extensive work-up for stroke rule out was completed in the hospital.  He also had a negative tox screen.CTA of head showed mild bilateral atherosclerosis less than 50% ICA stenosis and underlying chronic sinus disease.  CT of head with no acute intracranial abnormality.  Of note, patient does have a total cholesterol of 280 and triglycerides of 557.  Patient is apparently taking cholesterol medications but does admit to missing a dose occasionally.  I discussed the importance of adhering to medical management for dyslipidemia and very close follow-up with PCP.  At this time patient is due to be discharged managed in the outpatient setting. Echocardiogram showed normal EF, normal systolic and diastolic function no evidence of right to left shunt pericardial effusion.  EEG normal. Punctate focus of restricted diffusion in the body of the left hippocampus most consistent with an acute lacunar size infarct. This could certainly represent the etiology for acute or transient amnesia.  All findings and " treatment plans were discussed with patient and wife in detail.  Patient was continued on atorvastatin, high intensity, aspirin and Plavix.  He was referred to [facility].  At this time patient has no residual deficits on physical exam, has returned to baseline and is safe for discharge management in the outpatient setting.  I did reiterate the importance of following up with PCP and stroke [facility] for further management and prevention.    Therefore, he is discharged in good and stable condition to home with close outpatient follow-up.    The patient recovered much more quickly than anticipated on admission.    Discharge Date  4/27/2023    FOLLOW UP ITEMS POST DISCHARGE  FU with [facility]  Follow-up with PCP    DISCHARGE DIAGNOSES  Principal Problem:    Altered mental status POA: Yes  Active Problems:    Amnesia POA: Yes    Stenosis of both internal carotid arteries POA: Yes    Elevated liver enzymes POA: Yes  Resolved Problems:    * No resolved hospital problems. *      FOLLOW UP  No future appointments.  Shad St P.A.-C.  [address]  [address]  [facility] [address]  [phone]    Go on 5/4/2023  Please go to your appointment with ARNOL on May 4 at 2:30 pm with a check in of 2:15 pm with Shad St.      MEDICATIONS ON DISCHARGE     Medication List        START taking these medications        Instructions   aspirin 81 MG EC tablet   Take 1 Tablet by mouth every day.  Dose: 81 mg     clopidogrel 75 MG Tabs  Commonly known as: PLAVIX   Take 1 Tablet by mouth every day for 30 days.  Dose: 75 mg     fenofibrate 145 MG Tabs  Commonly known as: TRICOR   Take 1 Tablet by mouth every day.  Dose: 145 mg            CHANGE how you take these medications        Instructions   atorvastatin 40 MG Tabs  What changed:   medication strength  how much to take  Commonly known as: LIPITOR   Take 2 Tablets by mouth every evening.  Dose: 80 mg            STOP taking these medications       ibuprofen 200 MG Tabs  Commonly known as: MOTRIN     MULTIVITAMIN PO              Allergies  No Known Allergies    DIET  Orders Placed This Encounter   Procedures    Diet Order Diet: Regular     Standing Status:   Standing     Number of Occurrences:   1     Order Specific Question:   Diet:     Answer:   Regular [1]       ACTIVITY  As tolerated.  Weight bearing as tolerated    CONSULTATIONS  None    PROCEDURES  None    LABORATORY  Lab Results   Component Value Date    SODIUM 138 04/26/2023    POTASSIUM 3.5 (L) 04/26/2023    CHLORIDE 102 04/26/2023    CO2 23 04/26/2023    GLUCOSE 149 (H) 04/26/2023    BUN 13 04/26/2023    CREATININE 0.75 04/26/2023        Lab Results   Component Value Date    WBC 6.3 04/26/2023    HEMOGLOBIN 13.7 (L) 04/26/2023    HEMATOCRIT 41.5 (L) 04/26/2023    PLATELETCT 151 (L) 04/26/2023        Total time of the discharge process exceeds 45 minutes.

## 2023-04-27 NOTE — DISCHARGE INSTRUCTIONS
Discharge Instructions    Discharged to home by car with relative. Discharged via wheelchair, hospital escort: Yes.  Special equipment needed: Not Applicable    Be sure to schedule a follow-up appointment with your primary care doctor or any specialists as instructed.     Discharge Plan:   Diet Plan: Discussed  Activity Level: Discussed  Confirmed Follow up Appointment: Patient to Call and Schedule Appointment  Confirmed Symptoms Management: Discussed  Medication Reconciliation Updated: Yes    I understand that a diet low in cholesterol, fat, and sodium is recommended for good health. Unless I have been given specific instructions below for another diet, I accept this instruction as my diet prescription.   Other diet: as tolerated    Special Instructions: None    -Is this patient being discharged with medication to prevent blood clots?  Plavix    Is patient discharged on Warfarin / Coumadin?   No

## 2023-05-19 ENCOUNTER — TELEPHONE (OUTPATIENT)
Dept: CARDIOLOGY | Facility: MEDICAL CENTER | Age: 53
End: 2023-05-19
Payer: MEDICAID

## 2023-05-19 DIAGNOSIS — I63.9 ACUTE STROKE DUE TO ISCHEMIA (HCC): ICD-10-CM

## 2023-05-19 NOTE — TELEPHONE ENCOUNTER
Procedure(s):  LEFT TOTAL HIP ARTHORPLASTY/ DEPUY.     spinal    Anesthesia Post Evaluation      Multimodal analgesia: multimodal analgesia used between 6 hours prior to anesthesia start to PACU discharge  Patient location during evaluation: PACU  Patient participation: complete - patient participated  Level of consciousness: awake and alert  Pain management: adequate  Airway patency: patent  Anesthetic complications: no  Cardiovascular status: acceptable  Respiratory status: acceptable  Hydration status: acceptable  Post anesthesia nausea and vomiting:  controlled  Final Post Anesthesia Temperature Assessment:  Normothermia (36.0-37.5 degrees C)      INITIAL Post-op Vital signs:   Vitals Value Taken Time   /69 02/18/21 1600   Temp 36.6 °C (97.8 °F) 02/18/21 1330   Pulse 60 02/18/21 1600   Resp 16 02/18/21 1600   SpO2 100 % 02/18/21 1600 Zio Patch EOS Tracings to Mondays ADD HANNAH's nurse Calin on 5/19/2023

## 2023-06-01 ENCOUNTER — APPOINTMENT (OUTPATIENT)
Dept: LAB | Facility: MEDICAL CENTER | Age: 53
End: 2023-06-01
Payer: MEDICAID

## 2024-06-12 ENCOUNTER — APPOINTMENT (OUTPATIENT)
Dept: RADIOLOGY | Facility: MEDICAL CENTER | Age: 54
End: 2024-06-12
Attending: STUDENT IN AN ORGANIZED HEALTH CARE EDUCATION/TRAINING PROGRAM
Payer: MEDICAID

## 2024-06-12 ENCOUNTER — HOSPITAL ENCOUNTER (EMERGENCY)
Facility: MEDICAL CENTER | Age: 54
End: 2024-06-12
Attending: STUDENT IN AN ORGANIZED HEALTH CARE EDUCATION/TRAINING PROGRAM
Payer: MEDICAID

## 2024-06-12 VITALS
BODY MASS INDEX: 28.43 KG/M2 | SYSTOLIC BLOOD PRESSURE: 119 MMHG | DIASTOLIC BLOOD PRESSURE: 70 MMHG | RESPIRATION RATE: 18 BRPM | HEIGHT: 71 IN | OXYGEN SATURATION: 92 % | HEART RATE: 84 BPM | WEIGHT: 203.04 LBS | TEMPERATURE: 97.9 F

## 2024-06-12 DIAGNOSIS — J06.9 UPPER RESPIRATORY TRACT INFECTION, UNSPECIFIED TYPE: ICD-10-CM

## 2024-06-12 LAB
FLUAV RNA SPEC QL NAA+PROBE: NEGATIVE
FLUBV RNA SPEC QL NAA+PROBE: NEGATIVE
RSV RNA SPEC QL NAA+PROBE: NEGATIVE
SARS-COV-2 RNA RESP QL NAA+PROBE: NOTDETECTED

## 2024-06-12 PROCEDURE — 71045 X-RAY EXAM CHEST 1 VIEW: CPT

## 2024-06-12 PROCEDURE — A9270 NON-COVERED ITEM OR SERVICE: HCPCS | Mod: UD | Performed by: STUDENT IN AN ORGANIZED HEALTH CARE EDUCATION/TRAINING PROGRAM

## 2024-06-12 PROCEDURE — 700102 HCHG RX REV CODE 250 W/ 637 OVERRIDE(OP): Mod: UD | Performed by: STUDENT IN AN ORGANIZED HEALTH CARE EDUCATION/TRAINING PROGRAM

## 2024-06-12 PROCEDURE — 0241U HCHG SARS-COV-2 COVID-19 NFCT DS RESP RNA 4 TRGT ED POC: CPT

## 2024-06-12 PROCEDURE — 99284 EMERGENCY DEPT VISIT MOD MDM: CPT

## 2024-06-12 RX ORDER — ACETAMINOPHEN 325 MG/1
650 TABLET ORAL ONCE
Status: COMPLETED | OUTPATIENT
Start: 2024-06-12 | End: 2024-06-12

## 2024-06-12 RX ORDER — IBUPROFEN 600 MG/1
600 TABLET ORAL ONCE
Status: COMPLETED | OUTPATIENT
Start: 2024-06-12 | End: 2024-06-12

## 2024-06-12 RX ADMIN — IBUPROFEN 600 MG: 600 TABLET, FILM COATED ORAL at 15:13

## 2024-06-12 RX ADMIN — ACETAMINOPHEN 650 MG: 325 TABLET, FILM COATED ORAL at 15:13

## 2024-06-12 ASSESSMENT — FIBROSIS 4 INDEX: FIB4 SCORE: 1.4

## 2024-06-12 NOTE — ED TRIAGE NOTES
"Chief Complaint   Patient presents with    Flu Like Symptoms     The pt reports body aches, cough, headache, and congestion with mucus for the last 10 days. The pt reports that he initially started to get better and then got worse.       Pt ambulatory to triage. Pt A&Ox4, for the above complaint.     Pt to lobby . Pt educated on alerting staff in changes to condition. Pt verbalized understanding. Protocol flu swab ordered.     /68   Pulse 88   Temp 37.2 °C (99 °F) (Temporal)   Resp 18   Ht 1.803 m (5' 11\")   Wt 92.1 kg (203 lb 0.7 oz)   SpO2 95%   BMI 28.32 kg/m²     "

## 2024-06-12 NOTE — DISCHARGE INSTRUCTIONS
You were seen in the emergency department for symptoms consistent with a viral upper respiratory infection.    Please take Tylenol 1000 mg and ibuprofen 400 mg every 6 hours to help with your symptoms unless you have a contraindication to one of these medications.    Keep yourself well-hydrated.    Keep a mask on while you are around other people and avoid contact with other people until your symptoms are improving and you have not had a fever for approximately 24 hours.    If you develop worsening chest pain or shortness of breath, come back to the emergency department for reevaluation.

## 2024-06-12 NOTE — ED PROVIDER NOTES
ED Provider Note    CHIEF COMPLAINT  Chief Complaint   Patient presents with    Flu Like Symptoms     The pt reports body aches, cough, headache, and congestion with mucus for the last 10 days. The pt reports that he initially started to get better and then got worse.       EXTERNAL RECORDS REVIEWED  [facility] discharge summary presentation 4/27/2023 presentation for altered mental status amnesia found to have carotid stenosis ultimately discharged 4/27/2023.  Reviewed for medical history.    HPI/ROS  LIMITATION TO HISTORY   Select: : None  OUTSIDE HISTORIAN(S):  none    Prashant Hudson Russo Jr. is a 53 y.o. male with past medical history of hyperlipidemia presenting to the emergency department for cough, congestion, increased feeding production present over the last 7 days.  First noticed symptoms last week, had sinus congestion, low-grade fevers, chills, body aches all of which have been improving.  He did notice that he has had a persistent cough and increased sputum production so he wanted to be assessed in the emergency department to ensure that he does not have pneumonia.  In general his symptoms have been slightly improving.  He has not had a fever or chills.  No prior history of COPD, asthma.  Not a smoker.    PAST MEDICAL HISTORY   has a past medical history of Dental disorder and Nephritic syndrome.    SURGICAL HISTORY   has a past surgical history that includes hip arthroscopy (3/2/2015); femoral neck osteoplasty (3/2/2015); and acetabular osteoplasty (3/2/2015).    FAMILY HISTORY  Family History   Problem Relation Age of Onset    Diabetes Father     Hypertension Father     Heart Disease Father     Stroke Maternal Grandmother     Cancer Paternal Grandmother        SOCIAL HISTORY  Social History     Tobacco Use    Smoking status: Never    Smokeless tobacco: Former     Types: Chew   Vaping Use    Vaping status: Never Used   Substance and Sexual Activity    Alcohol use: Yes     Comment: 2 per week    Drug  "use: Yes     Comment: cannabis    Sexual activity: Not on file       CURRENT MEDICATIONS  Home Medications       Reviewed by Laith Calle R.N. (Registered Nurse) on 24 at 1348  Med List Status: Partial     Medication Last Dose Status   aspirin 81 MG EC tablet  Active   atorvastatin (LIPITOR) 40 MG Tab  Active   fenofibrate (TRICOR) 145 MG Tab  Active                  Audit from Redirected Encounters    **Home medications have not yet been reviewed for this encounter**         ALLERGIES  No Known Allergies    PHYSICAL EXAM  VITAL SIGNS: /68   Pulse 88   Temp 37.2 °C (99 °F) (Temporal)   Resp 18   Ht 1.803 m (5' 11\")   Wt 92.1 kg (203 lb 0.7 oz)   SpO2 95%   BMI 28.32 kg/m²    General: Well- appearing , non-toxic, no acute distress  Neuro: oriented x 3, moving all extremities.   HEENT:   - Head: Normocephalic, atraumatic  - Eyes: PERRL  - Ears/Nose: normal external nose and ears  - Mouth: moist mucosal membranes.  Midline uvula.  No asymmetry.  No sublingual edema.  No trismus.  Resp: clear to auscultation, no increased work of breathing  CV: Regular rate and rhythm  Abd: Soft, non-tender, non-distended  Extremities: No peripheral edema  Psych: lucid and conversational           DIAGNOSTIC STUDIES / PROCEDURES    EKG  My independent EKG interpretation:  Results for orders placed or performed during the hospital encounter of 23   EKG (NOW)   Result Value Ref Range    Report       [facility] Emergency Dept.    Test Date:  2023  Pt Name:    REID SALOMON                   Department: [facility]  MRN:        [id number]                      [facility]  Gender:     Male                         Technician: [id number]  :        1970                   Requested By:LOKESH MAHONEY  Order #:    [id number]                    Reading MD: LOKESH MAHONEY D.O.    Measurements  Intervals                                Axis  Rate:       76                           P:       "    58  DE:         177                          QRS:        21  QRSD:       90                           T:          16  QT:         377  QTc:        424    Interpretive Statements  Sinus rhythm  No previous ECG available for comparison  Electronically Signed On 4- 20:13:38 PDT by LOKESH MAHONEY D.O.         LABS  Results for orders placed or performed during the hospital encounter of 06/12/24   POC CoV-2, FLU A/B, RSV by PCR   Result Value Ref Range    POC Influenza A RNA, PCR Negative Negative    POC Influenza B RNA, PCR Negative Negative    POC RSV, by PCR Negative Negative    POC SARS-CoV-2, PCR NotDetected        RADIOLOGY  I have independently interpreted the diagnostic imaging associated with this visit and am waiting the final reading from the radiologist.   My preliminary interpretation is as follows:   - Plain film of the chest reviewed shows no evidence of acute cardiopulmonary abnormality.  Radiologist interpretation:   DX-CHEST-PORTABLE (1 VIEW)    (Results Pending)           MEDICAL DECISION MAKING      ED COURSE AND PLAN    Prashant Russo is a 53 y.o. male presenting to the emergency department for cough, increased sputum production present for the last week.  He has had viral upper respiratory infectious symptoms over the last 7 days, most symptoms seem to be improving but he has persistent productive cough and wanted to be assessed for pneumonia.  On arrival to the emergency department his vital signs are stable, he is not septic or hypoxic.  He is overall well-appearing with a reassuring physical exam.  COVID flu RSV testing is all negative.  I obtained a chest x-ray to assess for pulmonary infiltrate, showed no evidence of acute cardiopulmonary abnormality.  Patient most likely is recovering from bronchitis, viral upper respiratory infection.  Advised on Tylenol ibuprofen, hydration.  Strict return precautions discussed.  Appropriate for discharge.    ---Pertinent ED Course---:    3:11 PM I  reviewed the patient's old records in Epic, medication list, allergies, past medical history and performed a physical examination.         Procedures:      ----------------------------------------------------------------------------------  DISCUSSIONS    I have discussed management of the patient with the following physicians and JOAO's:      Discussion of management with other [facility] or appropriate source(s):     Escalation of care considered, and ultimately not performed: Consider obtaining labs, no indication given reassuring vitals and chest x-ray and physical exam.    Barriers to care at this time, including but not limited to:     Decision tools and prescription drugs considered including, but not limited to: Considered but no indication for antibiotics    FINAL IMPRESSION    1. Upper respiratory tract infection, unspecified type        New Prescriptions    No medications on file         DISPOSITION    Discharge home, Stable        This chart was dictated using an electronic voice recognition software. The chart has been reviewed and edited but there is still possibility for dictation errors due to limitation of software.    Nabil Clifford, DO 6/12/2024